# Patient Record
Sex: MALE | Race: WHITE | NOT HISPANIC OR LATINO | Employment: FULL TIME | URBAN - METROPOLITAN AREA
[De-identification: names, ages, dates, MRNs, and addresses within clinical notes are randomized per-mention and may not be internally consistent; named-entity substitution may affect disease eponyms.]

---

## 2017-04-06 ENCOUNTER — HOSPITAL ENCOUNTER (OUTPATIENT)
Dept: PHYSICAL THERAPY | Facility: HOSPITAL | Age: 47
Setting detail: RECURRING SERIES
Discharge: HOME | End: 2017-07-31
Attending: FAMILY MEDICINE | Admitting: FAMILY MEDICINE

## 2017-05-18 ENCOUNTER — HOSPITAL ENCOUNTER (OUTPATIENT)
Dept: LAB | Facility: HOSPITAL | Age: 47
Discharge: HOME | End: 2017-05-18
Attending: INTERNAL MEDICINE | Admitting: INTERNAL MEDICINE

## 2017-05-18 LAB
ALBUMIN SERPL-MCNC: 4 G/DL (ref 3.5–4.8)
ALP SERPL-CCNC: 73 IU/L (ref 32–91)
ALT SERPL-CCNC: 32 IU/L (ref 17–63)
ANION GAP SERPL CALC-SCNC: 11 MMOL/L (ref 10–20)
AST SERPL-CCNC: 21 IU/L (ref 15–41)
BILIRUB DIRECT SERPL-MCNC: 0.1 MG/DL (ref 0.1–0.5)
BILIRUB SERPL-MCNC: 0.9 MG/DL (ref 0.3–1.2)
BUN SERPL-MCNC: 15 MG/DL (ref 8–20)
BUN/CREAT SERPL: 10 (ref 6.2–20.3)
CALCIUM SERPL-MCNC: 9.2 MG/DL (ref 8.9–10.3)
CHLORIDE SERPL-SCNC: 105 MMOL/L (ref 101–111)
CHOLEST SERPL-MCNC: 152 MG/DL
CHOLEST/HDLC SERPL: 4.2 {RATIO}
CK SERPL-CCNC: 163 IU/L (ref 49–397)
CONV CO2: 26 MMOL/L (ref 22–32)
CONV LDL CHOLESTEROL DIRECT: 63 MG/DL (ref 0–100)
CONV TOTAL PROTEIN: 7 G/DL (ref 6.1–7.9)
CREAT UR-MCNC: 1.5 MG/DL (ref 0.7–1.2)
GLUCOSE SERPL-MCNC: 101 MG/DL (ref 65–99)
HDLC SERPL-MCNC: 36 MG/DL
LDLC/HDLC SERPL: 1.7 {RATIO}
LIPID INTERPRETATION: ABNORMAL
POTASSIUM SERPL-SCNC: 4 MMOL/L (ref 3.6–5.1)
SODIUM SERPL-SCNC: 138 MMOL/L (ref 136–144)
TRIGL SERPL-MCNC: 148 MG/DL
VLDLC SERPL CALC-MCNC: 53.3 MG/DL

## 2017-11-28 ENCOUNTER — HOSPITAL ENCOUNTER (OUTPATIENT)
Dept: LAB | Facility: HOSPITAL | Age: 47
Discharge: HOME | End: 2017-11-28
Attending: EMERGENCY MEDICINE | Admitting: EMERGENCY MEDICINE

## 2017-11-28 LAB
PSA SERPL-MCNC: 1.48 NG/ML (ref 0–4)
TESTOST SERPL-MCNC: 6.49 NG/ML (ref 1.7–7.8)

## 2019-12-19 PROBLEM — E66.9 OBESITY: Status: ACTIVE | Noted: 2019-12-19

## 2020-01-29 ENCOUNTER — OFFICE VISIT (OUTPATIENT)
Dept: CARDIOLOGY | Facility: CLINIC | Age: 50
End: 2020-01-29

## 2020-01-29 VITALS
HEIGHT: 75 IN | WEIGHT: 307 LBS | BODY MASS INDEX: 38.17 KG/M2 | OXYGEN SATURATION: 97 % | DIASTOLIC BLOOD PRESSURE: 90 MMHG | SYSTOLIC BLOOD PRESSURE: 144 MMHG | HEART RATE: 65 BPM

## 2020-01-29 DIAGNOSIS — E78.5 DYSLIPIDEMIA: ICD-10-CM

## 2020-01-29 DIAGNOSIS — I10 ESSENTIAL (PRIMARY) HYPERTENSION: Primary | ICD-10-CM

## 2020-01-29 DIAGNOSIS — G47.30 SLEEP APNEA, UNSPECIFIED TYPE: ICD-10-CM

## 2020-01-29 DIAGNOSIS — I49.3 PVCS (PREMATURE VENTRICULAR CONTRACTIONS): ICD-10-CM

## 2020-01-29 PROCEDURE — 99213 OFFICE O/P EST LOW 20 MIN: CPT | Performed by: INTERNAL MEDICINE

## 2020-01-29 PROCEDURE — 93000 ELECTROCARDIOGRAM COMPLETE: CPT | Performed by: INTERNAL MEDICINE

## 2020-02-26 ENCOUNTER — HOSPITAL ENCOUNTER (OUTPATIENT)
Dept: CARDIOLOGY | Facility: HOSPITAL | Age: 50
Discharge: HOME | End: 2020-02-26

## 2020-02-26 ENCOUNTER — HOSPITAL ENCOUNTER (OUTPATIENT)
Dept: CARDIOLOGY | Facility: HOSPITAL | Age: 50
Discharge: HOME | End: 2020-02-26
Admitting: INTERNAL MEDICINE

## 2020-02-26 VITALS
SYSTOLIC BLOOD PRESSURE: 124 MMHG | WEIGHT: 300 LBS | DIASTOLIC BLOOD PRESSURE: 62 MMHG | HEIGHT: 75 IN | BODY MASS INDEX: 37.3 KG/M2

## 2020-02-26 DIAGNOSIS — E78.5 DYSLIPIDEMIA: ICD-10-CM

## 2020-02-26 DIAGNOSIS — I10 ESSENTIAL (PRIMARY) HYPERTENSION: ICD-10-CM

## 2020-02-26 DIAGNOSIS — G47.30 SLEEP APNEA, UNSPECIFIED TYPE: ICD-10-CM

## 2020-02-26 DIAGNOSIS — I49.3 PVCS (PREMATURE VENTRICULAR CONTRACTIONS): ICD-10-CM

## 2020-02-26 LAB
BH CV STRESS BP STAGE 1: NORMAL
BH CV STRESS BP STAGE 2: NORMAL
BH CV STRESS BP STAGE 3: NORMAL
BH CV STRESS DURATION MIN STAGE 1: 2
BH CV STRESS DURATION MIN STAGE 2: 2
BH CV STRESS DURATION MIN STAGE 3: 2
BH CV STRESS DURATION MIN STAGE 4: 4
BH CV STRESS DURATION SEC STAGE 1: 0
BH CV STRESS DURATION SEC STAGE 2: 0
BH CV STRESS DURATION SEC STAGE 3: 0
BH CV STRESS DURATION SEC STAGE 4: 0
BH CV STRESS GRADE STAGE 1: 10
BH CV STRESS GRADE STAGE 2: 12
BH CV STRESS GRADE STAGE 3: 14
BH CV STRESS GRADE STAGE 4: 16
BH CV STRESS HR STAGE 1: 95
BH CV STRESS HR STAGE 2: 111
BH CV STRESS HR STAGE 3: 140
BH CV STRESS HR STAGE 4: 144
BH CV STRESS METS STAGE 1: 5
BH CV STRESS METS STAGE 2: 7.5
BH CV STRESS METS STAGE 3: 10
BH CV STRESS METS STAGE 4: 13.5
BH CV STRESS PROTOCOL 1: NORMAL
BH CV STRESS RECOVERY BP: NORMAL MMHG
BH CV STRESS RECOVERY HR: 99 BPM
BH CV STRESS SPEED STAGE 1: 1.7
BH CV STRESS SPEED STAGE 2: 2.5
BH CV STRESS SPEED STAGE 3: 3.4
BH CV STRESS SPEED STAGE 4: 4.2
BH CV STRESS STAGE 1: 1
BH CV STRESS STAGE 2: 2
BH CV STRESS STAGE 3: 3
BH CV STRESS STAGE 4: 4
MAXIMAL PREDICTED HEART RATE: 170 BPM
STRESS BASELINE BP: NORMAL MMHG
STRESS BASELINE HR: 67 BPM
STRESS POST ESTIMATED WORKLOAD: 10 METS
STRESS POST EXERCISE DUR MIN: 6 MIN
STRESS POST EXERCISE DUR SEC: 0 SEC
STRESS TARGET HR: 145 BPM

## 2020-02-26 PROCEDURE — 93018 CV STRESS TEST I&R ONLY: CPT | Performed by: INTERNAL MEDICINE

## 2020-02-26 PROCEDURE — 93306 TTE W/DOPPLER COMPLETE: CPT

## 2020-02-26 PROCEDURE — 93017 CV STRESS TEST TRACING ONLY: CPT

## 2020-02-26 PROCEDURE — 93016 CV STRESS TEST SUPVJ ONLY: CPT | Performed by: INTERNAL MEDICINE

## 2020-02-29 LAB
BH CV ECHO MEAS - ACS: 2.3 CM
BH CV ECHO MEAS - AO MAX PG (FULL): 4.3 MMHG
BH CV ECHO MEAS - AO MAX PG: 9.4 MMHG
BH CV ECHO MEAS - AO MEAN PG (FULL): 2.8 MMHG
BH CV ECHO MEAS - AO MEAN PG: 5.5 MMHG
BH CV ECHO MEAS - AO ROOT AREA: 11 CM^2
BH CV ECHO MEAS - AO ROOT DIAM: 3.7 CM
BH CV ECHO MEAS - AO V2 MAX: 153.4 CM/SEC
BH CV ECHO MEAS - AO V2 MEAN: 112.3 CM/SEC
BH CV ECHO MEAS - AO V2 VTI: 34.4 CM
BH CV ECHO MEAS - ASC AORTA: 3.3 CM
BH CV ECHO MEAS - AVA(I,A): 2.8 CM^2
BH CV ECHO MEAS - AVA(I,D): 2.8 CM^2
BH CV ECHO MEAS - AVA(V,A): 3.5 CM^2
BH CV ECHO MEAS - AVA(V,D): 3.5 CM^2
BH CV ECHO MEAS - EDV(CUBED): 135.7 ML
BH CV ECHO MEAS - EDV(MOD-SP4): 163.8 ML
BH CV ECHO MEAS - EDV(TEICH): 126 ML
BH CV ECHO MEAS - EF(CUBED): 38.7 %
BH CV ECHO MEAS - EF(MOD-SP4): 64.4 %
BH CV ECHO MEAS - EF(TEICH): 31.7 %
BH CV ECHO MEAS - ESV(CUBED): 83.2 ML
BH CV ECHO MEAS - ESV(MOD-SP4): 58.3 ML
BH CV ECHO MEAS - ESV(TEICH): 86 ML
BH CV ECHO MEAS - FS: 15.1 %
BH CV ECHO MEAS - IVS/LVPW: 1.2
BH CV ECHO MEAS - IVSD: 0.96 CM
BH CV ECHO MEAS - LA DIMENSION: 5.4 CM
BH CV ECHO MEAS - LA/AO: 1.4
BH CV ECHO MEAS - LV MASS(C)D: 158.2 GRAMS
BH CV ECHO MEAS - LV MAX PG: 5.1 MMHG
BH CV ECHO MEAS - LV MEAN PG: 2.6 MMHG
BH CV ECHO MEAS - LV V1 MAX: 113 CM/SEC
BH CV ECHO MEAS - LV V1 MEAN: 76.3 CM/SEC
BH CV ECHO MEAS - LV V1 VTI: 20.1 CM
BH CV ECHO MEAS - LVIDD: 5.1 CM
BH CV ECHO MEAS - LVIDS: 4.4 CM
BH CV ECHO MEAS - LVOT AREA: 4.8 CM^2
BH CV ECHO MEAS - LVOT DIAM: 2.5 CM
BH CV ECHO MEAS - LVPWD: 0.78 CM
BH CV ECHO MEAS - MV A MAX VEL: 55.2 CM/SEC
BH CV ECHO MEAS - MV DEC SLOPE: 516.6 CM/SEC^2
BH CV ECHO MEAS - MV DEC TIME: 0.17 SEC
BH CV ECHO MEAS - MV E MAX VEL: 90.3 CM/SEC
BH CV ECHO MEAS - MV E/A: 1.6
BH CV ECHO MEAS - MV MAX PG: 3.4 MMHG
BH CV ECHO MEAS - MV MEAN PG: 1.5 MMHG
BH CV ECHO MEAS - MV V2 MAX: 92.5 CM/SEC
BH CV ECHO MEAS - MV V2 MEAN: 57.2 CM/SEC
BH CV ECHO MEAS - MV V2 VTI: 30.1 CM
BH CV ECHO MEAS - MVA(VTI): 3.2 CM^2
BH CV ECHO MEAS - PA ACC TIME: 0.15 SEC
BH CV ECHO MEAS - PA MAX PG (FULL): 5.2 MMHG
BH CV ECHO MEAS - PA MAX PG: 7.3 MMHG
BH CV ECHO MEAS - PA PR(ACCEL): 10.5 MMHG
BH CV ECHO MEAS - PA V2 MAX: 135.3 CM/SEC
BH CV ECHO MEAS - RV MAX PG: 2.1 MMHG
BH CV ECHO MEAS - RV MEAN PG: 1.2 MMHG
BH CV ECHO MEAS - RV V1 MAX: 72.3 CM/SEC
BH CV ECHO MEAS - RV V1 MEAN: 51.7 CM/SEC
BH CV ECHO MEAS - RV V1 VTI: 16.8 CM
BH CV ECHO MEAS - SV(AO): 377.3 ML
BH CV ECHO MEAS - SV(CUBED): 52.6 ML
BH CV ECHO MEAS - SV(LVOT): 95.9 ML
BH CV ECHO MEAS - SV(MOD-SP4): 105.4 ML
BH CV ECHO MEAS - SV(TEICH): 40 ML
LV EF 2D ECHO EST: 50 %
MAXIMAL PREDICTED HEART RATE: 170 BPM
STRESS TARGET HR: 145 BPM

## 2020-02-29 PROCEDURE — 93306 TTE W/DOPPLER COMPLETE: CPT | Performed by: INTERNAL MEDICINE

## 2020-03-12 RX ORDER — BENAZEPRIL HYDROCHLORIDE 40 MG/1
TABLET, FILM COATED ORAL
Qty: 90 TABLET | Refills: 2 | Status: SHIPPED | OUTPATIENT
Start: 2020-03-12 | End: 2020-12-21

## 2020-08-14 RX ORDER — HYDRALAZINE HYDROCHLORIDE 50 MG/1
TABLET, FILM COATED ORAL
Qty: 180 TABLET | Refills: 2 | Status: SHIPPED | OUTPATIENT
Start: 2020-08-14 | End: 2021-05-24 | Stop reason: SDUPTHER

## 2020-12-19 DIAGNOSIS — I10 ESSENTIAL (PRIMARY) HYPERTENSION: Primary | ICD-10-CM

## 2020-12-19 DIAGNOSIS — E78.5 DYSLIPIDEMIA: ICD-10-CM

## 2020-12-21 NOTE — TELEPHONE ENCOUNTER
Spoke with pt - advised of labs needed and if not able to get done before appointment on 1/29, will need to r/s   Gave 1 mo supply medication.   Orders in.   Says will have drawn @ PCP office.

## 2020-12-22 RX ORDER — BENAZEPRIL HYDROCHLORIDE 40 MG/1
TABLET, FILM COATED ORAL
Qty: 30 TABLET | Refills: 0 | Status: SHIPPED | OUTPATIENT
Start: 2020-12-22 | End: 2021-03-10 | Stop reason: SDUPTHER

## 2021-01-29 ENCOUNTER — OFFICE VISIT (OUTPATIENT)
Dept: CARDIOLOGY | Facility: CLINIC | Age: 51
End: 2021-01-29

## 2021-01-29 VITALS
OXYGEN SATURATION: 98 % | DIASTOLIC BLOOD PRESSURE: 84 MMHG | WEIGHT: 312 LBS | HEIGHT: 75 IN | BODY MASS INDEX: 38.79 KG/M2 | TEMPERATURE: 96.8 F | HEART RATE: 67 BPM | SYSTOLIC BLOOD PRESSURE: 140 MMHG

## 2021-01-29 DIAGNOSIS — G47.30 SLEEP APNEA, UNSPECIFIED TYPE: ICD-10-CM

## 2021-01-29 DIAGNOSIS — I10 ESSENTIAL (PRIMARY) HYPERTENSION: ICD-10-CM

## 2021-01-29 DIAGNOSIS — I49.3 PVCS (PREMATURE VENTRICULAR CONTRACTIONS): ICD-10-CM

## 2021-01-29 DIAGNOSIS — E78.5 DYSLIPIDEMIA: Primary | ICD-10-CM

## 2021-01-29 PROCEDURE — 93000 ELECTROCARDIOGRAM COMPLETE: CPT | Performed by: INTERNAL MEDICINE

## 2021-01-29 PROCEDURE — 99214 OFFICE O/P EST MOD 30 MIN: CPT | Performed by: INTERNAL MEDICINE

## 2021-01-29 NOTE — PROGRESS NOTES
"    Subjective:     Encounter Date:01/29/2021      Patient ID: Chalo Hatch is a 51 y.o. male.    Chief Complaint: Hypertension, Hyperlipidemia, & PVC's  History of Present Illness     51-year-old white male patient with known history of hypertension and palpitation,obesity, comes back for followup.       patient also has problems with a dyslipidemia hypertension and obstructive sleep apnea        patient does have underlying chronic renal insufficiency     Patient stopped  the amlodipine due to the swelling continue benazepril and also I started him on  hydralazine   patient blood pressure is better   swelling is much better  February 2020 treadmill stress test no ischemia no arrhythmia  February 2020 echocardiogram EF low normal at 50%    Patient did labs recently with PCP pending he is doing well from cardiac standpoint occasional headaches his blood pressure is actually better now  Will be followed by PCP regarding the problems with headache especially if still having headaches with normal blood pressure  I will see him again in 1 year    The following portions of the patient's history were reviewed and updated as appropriate: Allergies current medications past family history past medical history past social history past surgical history problem list and review of systems  Past Medical History:   Diagnosis Date   • Chronic renal insufficiency    • Dyslipidemia    • Hypertension    • Obesity    • PVC (premature ventricular contraction)     Hx of   • Seasonal allergies    • Sleep apnea     Using CPAP     Past Surgical History:   Procedure Laterality Date   • VASECTOMY       /84 (BP Location: Right arm, Patient Position: Sitting, Cuff Size: Large Adult)   Pulse 67   Temp 96.8 °F (36 °C) (Infrared)   Ht 190.5 cm (75\")   Wt (!) 142 kg (312 lb)   SpO2 98%   BMI 39.00 kg/m²   Family History   Problem Relation Age of Onset   • Hypertension Mother    • Clotting disorder Mother    • Hypertension Father    • " No Known Problems Sister    • No Known Problems Brother    • No Known Problems Maternal Aunt    • No Known Problems Maternal Uncle    • No Known Problems Paternal Aunt    • No Known Problems Paternal Uncle    • No Known Problems Maternal Grandmother    • No Known Problems Maternal Grandfather    • No Known Problems Paternal Grandmother    • Diabetes Paternal Grandfather    • No Known Problems Other    • Anemia Neg Hx    • Arrhythmia Neg Hx    • Asthma Neg Hx    • Fainting Neg Hx    • Heart attack Neg Hx    • Heart disease Neg Hx    • Heart failure Neg Hx    • Hyperlipidemia Neg Hx        Current Outpatient Medications:   •  benazepril (LOTENSIN) 40 MG tablet, TAKE 1 TABLET BY MOUTH EVERY DAY, Disp: 30 tablet, Rfl: 0  •  cetirizine (ZYRTEC ALLERGY) 10 MG tablet, Take 1 tablet by mouth every night at bedtime., Disp: , Rfl:   •  hydrALAZINE (APRESOLINE) 50 MG tablet, TAKE 1 TABLET BY MOUTH TWICE A DAY, Disp: 180 tablet, Rfl: 2  •  levothyroxine (SYNTHROID, LEVOTHROID) 50 MCG tablet, Take 1 tablet by mouth Every Morning Before Breakfast., Disp: , Rfl:   •  Omega-3 Fatty Acids (FISH OIL) 1000 MG capsule capsule, Take 1 capsule by mouth Daily., Disp: , Rfl:   •  triamterene-hydrochlorothiazide (MAXZIDE-25) 37.5-25 MG per tablet, Take 1 tablet by mouth Daily., Disp: , Rfl:   Social History     Socioeconomic History   • Marital status:      Spouse name: Not on file   • Number of children: Not on file   • Years of education: Not on file   • Highest education level: Not on file   Tobacco Use   • Smoking status: Never Smoker   • Smokeless tobacco: Never Used   Substance and Sexual Activity   • Alcohol use: Yes     Comment: Social   • Drug use: Never   • Sexual activity: Defer     Allergies   Allergen Reactions   • Hydrocodone-Acetaminophen Other (See Comments)     Cold; sweaty     Review of Systems   Constitution: Negative for fever and malaise/fatigue.   HENT: Negative for congestion and hearing loss.    Eyes: Negative  for double vision and visual disturbance.   Cardiovascular: Negative for chest pain, claudication, dyspnea on exertion, leg swelling and syncope.   Respiratory: Negative for cough and shortness of breath.    Endocrine: Negative for cold intolerance.   Skin: Negative for color change and rash.   Musculoskeletal: Negative for arthritis and joint pain.   Gastrointestinal: Negative for abdominal pain and heartburn.   Genitourinary: Negative for hematuria.   Neurological: Positive for headaches. Negative for excessive daytime sleepiness and dizziness.   Psychiatric/Behavioral: Negative for depression. The patient is not nervous/anxious.    All other systems reviewed and are negative.             Objective:     Physical Exam  Patient is stable not in any acute distress   Vitals reviewed neck no JVP elevation lungs but and mostly clear heart sounds S1-S2 regular no significant murmur gallop extremities no edema bilateral pulses present equal        ECG 12 Lead    Date/Time: 1/29/2021 9:25 AM  Performed by: Nicola Lopez MD  Authorized by: Nicola Lopez MD   Comments: EKG today normal sinus rhythm borderline right axis deviation compared to the last EKG no changes noted            Lab Review:       Assessment:          Diagnosis Plan   1. Dyslipidemia  CK    Lipid Panel    Comprehensive Metabolic Panel   2. Essential (primary) hypertension  CK    Lipid Panel    Comprehensive Metabolic Panel   3. Sleep apnea, unspecified type  CK    Lipid Panel    Comprehensive Metabolic Panel   4. PVCs (premature ventricular contractions)  CK    Lipid Panel    Comprehensive Metabolic Panel          Plan:       MDM  Number of Diagnoses or Management Options  Dyslipidemia: established, improving  Essential (primary) hypertension: established, improving  PVCs (premature ventricular contractions): established, improving  Sleep apnea, unspecified type: established, improving     Amount and/or Complexity of Data  Reviewed  Clinical lab tests: reviewed and ordered  Review and summarize past medical records: yes  Independent visualization of images, tracings, or specimens: yes    Risk of Complications, Morbidity, and/or Mortality  Presenting problems: moderate  Management options: moderate    Patient Progress  Patient progress: stable    Continue aggressive control of hypertension dyslipidemia sleep apnea needs to be aggressively controlled no more problems with palpitations  Will review the recent labs advised him labs again in the next 1 year  Follow-up with PCP regarding headaches

## 2021-03-10 RX ORDER — BENAZEPRIL HYDROCHLORIDE 40 MG/1
40 TABLET, FILM COATED ORAL DAILY
Qty: 90 TABLET | Refills: 1 | Status: SHIPPED | OUTPATIENT
Start: 2021-03-10 | End: 2021-09-17

## 2021-03-25 NOTE — PROGRESS NOTES
Subjective:     Encounter Date:03/26/2021      Patient ID: Chalo Hatch is a 51 y.o. male.    Chief Complaint: Uncontrolled HTN  History of Present Illness  51-year-old white male patient with known history of hypertension and palpitation,obesity, comes back for followup.       patient also has problems with a dyslipidemia hypertension and obstructive sleep apnea         patient does have underlying chronic renal insufficiency     Patient stopped  the amlodipine due to the swelling continue benazepril and also I started him on  hydralazine     swelling is much better  February 2020 treadmill stress test no ischemia no arrhythmia  February 2020 echocardiogram EF low normal at 50%  Patient comes today because of uncontrolled hypertension yesterday he increase triamterene hydrochlorothiazide to 75/50 every day  Blood pressure is getting better advised him to increase hydralazine to 53 times daily if the blood pressure is still uncontrolled  Needs aggressive control of sleep apnea weight loss salt reduction to help with blood pressure control follow-up as scheduled  Patient was advised to follow-up with PCP regarding electrolytes rechecked due to the recent increase in Maxzide    The following portions of the patient's history were reviewed and updated as appropriate: Allergies current medications past family history past medical history past social history past surgical history problem list and review of systems  Past Medical History:   Diagnosis Date   • Chronic renal insufficiency    • Dyslipidemia    • Hypertension    • Obesity    • PVC (premature ventricular contraction)     Hx of   • Seasonal allergies    • Sleep apnea     Using CPAP     Past Surgical History:   Procedure Laterality Date   • VASECTOMY       There were no vitals taken for this visit.  Family History   Problem Relation Age of Onset   • Hypertension Mother    • Clotting disorder Mother    • Hypertension Father    • No Known Problems Sister     • No Known Problems Brother    • No Known Problems Maternal Aunt    • No Known Problems Maternal Uncle    • No Known Problems Paternal Aunt    • No Known Problems Paternal Uncle    • No Known Problems Maternal Grandmother    • No Known Problems Maternal Grandfather    • No Known Problems Paternal Grandmother    • Diabetes Paternal Grandfather    • No Known Problems Other    • Anemia Neg Hx    • Arrhythmia Neg Hx    • Asthma Neg Hx    • Fainting Neg Hx    • Heart attack Neg Hx    • Heart disease Neg Hx    • Heart failure Neg Hx    • Hyperlipidemia Neg Hx        Current Outpatient Medications:   •  benazepril (LOTENSIN) 40 MG tablet, Take 1 tablet by mouth Daily., Disp: 90 tablet, Rfl: 1  •  cetirizine (ZYRTEC ALLERGY) 10 MG tablet, Take 1 tablet by mouth every night at bedtime., Disp: , Rfl:   •  cyclobenzaprine (FLEXERIL) 10 MG tablet, 1 tablet p.o. every 8 hours as needed, Disp: 15 tablet, Rfl: 0  •  hydrALAZINE (APRESOLINE) 50 MG tablet, TAKE 1 TABLET BY MOUTH TWICE A DAY, Disp: 180 tablet, Rfl: 2  •  levothyroxine (SYNTHROID, LEVOTHROID) 50 MCG tablet, Take 1 tablet by mouth Every Morning Before Breakfast., Disp: , Rfl:   •  Omega-3 Fatty Acids (FISH OIL) 1000 MG capsule capsule, Take 1 capsule by mouth Daily., Disp: , Rfl:   •  traMADol (ULTRAM) 50 MG tablet, 1 tablet p.o. every 6 hours, Disp: 20 tablet, Rfl: 0  •  triamterene-hydrochlorothiazide (MAXZIDE-25) 37.5-25 MG per tablet, Take 1 tablet by mouth Daily., Disp: , Rfl:   Social History     Socioeconomic History   • Marital status:      Spouse name: Not on file   • Number of children: Not on file   • Years of education: Not on file   • Highest education level: Not on file   Tobacco Use   • Smoking status: Current Every Day Smoker   • Smokeless tobacco: Never Used   Vaping Use   • Vaping Use: Never used   Substance and Sexual Activity   • Alcohol use: Yes     Comment: Social   • Drug use: Never   • Sexual activity: Defer     Allergies   Allergen  Reactions   • Hydrocodone-Acetaminophen Other (See Comments)     Cold; sweaty     Review of Systems   Constitutional: Negative for fever and malaise/fatigue.   HENT: Negative for congestion and hearing loss.    Eyes: Negative for double vision and visual disturbance.   Cardiovascular: Negative for chest pain, claudication, dyspnea on exertion, leg swelling and syncope.   Respiratory: Negative for cough and shortness of breath.    Endocrine: Negative for cold intolerance.   Skin: Negative for color change and rash.   Musculoskeletal: Negative for arthritis and joint pain.   Gastrointestinal: Negative for abdominal pain and heartburn.   Genitourinary: Negative for hematuria.   Neurological: Negative for excessive daytime sleepiness and dizziness.   Psychiatric/Behavioral: Negative for depression. The patient is not nervous/anxious.    All other systems reviewed and are negative.             Objective:     Physical Exam  Blood pressure 144/84 today better than yesterday neck no JVP elevation lungs clear heart sounds S1-S2 regular extremities no edema bilateral pulses present equal  Procedures    Lab Review:       Assessment:         No diagnosis found.       Plan:       MDM  Number of Diagnoses or Management Options  Dyslipidemia: established, improving  Essential (primary) hypertension: established, worsening  PVCs (premature ventricular contractions): established, improving  Sleep apnea, unspecified type: established, improving     Amount and/or Complexity of Data Reviewed  Review and summarize past medical records: yes    Risk of Complications, Morbidity, and/or Mortality  Presenting problems: moderate  Management options: moderate    Patient Progress  Patient progress: other (comment)

## 2021-03-26 ENCOUNTER — OFFICE VISIT (OUTPATIENT)
Dept: CARDIOLOGY | Facility: CLINIC | Age: 51
End: 2021-03-26

## 2021-03-26 VITALS
OXYGEN SATURATION: 97 % | HEIGHT: 75 IN | TEMPERATURE: 97.5 F | SYSTOLIC BLOOD PRESSURE: 143 MMHG | HEART RATE: 69 BPM | BODY MASS INDEX: 38.1 KG/M2 | WEIGHT: 306.4 LBS | DIASTOLIC BLOOD PRESSURE: 84 MMHG

## 2021-03-26 DIAGNOSIS — I49.3 PVCS (PREMATURE VENTRICULAR CONTRACTIONS): ICD-10-CM

## 2021-03-26 DIAGNOSIS — E78.5 DYSLIPIDEMIA: Primary | ICD-10-CM

## 2021-03-26 DIAGNOSIS — I10 ESSENTIAL (PRIMARY) HYPERTENSION: ICD-10-CM

## 2021-03-26 DIAGNOSIS — G47.30 SLEEP APNEA, UNSPECIFIED TYPE: ICD-10-CM

## 2021-03-26 PROCEDURE — 99214 OFFICE O/P EST MOD 30 MIN: CPT | Performed by: INTERNAL MEDICINE

## 2021-03-26 RX ORDER — TRIAMTERENE AND HYDROCHLOROTHIAZIDE 75; 50 MG/1; MG/1
1 TABLET ORAL DAILY
COMMUNITY
Start: 2021-03-25 | End: 2022-01-31 | Stop reason: SDUPTHER

## 2021-03-26 RX ORDER — PHENTERMINE HYDROCHLORIDE 37.5 MG/1
17.25 CAPSULE ORAL EVERY MORNING
COMMUNITY
End: 2022-01-31 | Stop reason: ALTCHOICE

## 2021-05-24 RX ORDER — HYDRALAZINE HYDROCHLORIDE 50 MG/1
50 TABLET, FILM COATED ORAL 2 TIMES DAILY
Qty: 180 TABLET | Refills: 3 | Status: SHIPPED | OUTPATIENT
Start: 2021-05-24 | End: 2022-03-21

## 2021-07-15 ENCOUNTER — TELEPHONE (OUTPATIENT)
Dept: NEUROLOGY | Facility: CLINIC | Age: 51
End: 2021-07-15

## 2021-07-15 NOTE — TELEPHONE ENCOUNTER
Provider: DR SEIPEL    Caller: DANIEL MOLINA    Relationship to Patient: SELF    Phone Number: [phone]    Reason for Call: THE PT CALLED IN BECAUSE HE HAS ONE OF THE CPAP MACHINES WITH THE RECALL. I NOTIFIED HIM TO CONTACT HIS DME COMPANY. ALSO PLEASE GIVE HIM CALL TO ADVISE IF HE SHOULD CONTINUE USING THE MACHINE OR NOT.

## 2021-07-19 NOTE — TELEPHONE ENCOUNTER
The company has not given out information on the absolute  risk of problems using the machine so I don not know.      Assuming relatively low risk from using the machine,and history of moderate jeannie, I recommend continued use given jeannie and significant comorbidity, such as HTN      [place] machine with [employer].     Obtain new device asap

## 2021-07-26 NOTE — TELEPHONE ENCOUNTER
Pt had not registered machine. Was given website information to do so.    He was advised to continue using it at this time. And to handwash only

## 2021-09-17 RX ORDER — BENAZEPRIL HYDROCHLORIDE 40 MG/1
TABLET, FILM COATED ORAL
Qty: 90 TABLET | Refills: 1 | Status: SHIPPED | OUTPATIENT
Start: 2021-09-17 | End: 2022-03-21

## 2021-09-17 NOTE — TELEPHONE ENCOUNTER
Rx Refill Note  Requested Prescriptions     Pending Prescriptions Disp Refills   • benazepril (LOTENSIN) 40 MG tablet [Pharmacy Med Name: BENAZEPRIL HCL 40 MG TABLET] 90 tablet 1     Sig: TAKE 1 TABLET BY MOUTH EVERY DAY      Last office visit with prescribing clinician: 3/26/2021      Next office visit with prescribing clinician: 1/31/2022            Radha Varela MA  09/17/21, 09:07 EDT

## 2022-01-11 ENCOUNTER — TELEPHONE (OUTPATIENT)
Dept: NEUROLOGY | Facility: CLINIC | Age: 52
End: 2022-01-11

## 2022-01-11 NOTE — TELEPHONE ENCOUNTER
SEIPEL KAREN    WIFE    [phone]    SEE ENCOUNTER THREAD FROM 7-15. PATIENT'S MACHINE HAS NOW BEEN REGISTERED. WIFE INGE SAYS IT WAS REGISTERED IN July BUT THAT PATIENT NEVER CALLED BACK.     MACHINE MORE THAN 5 YEARS OLD BUT CANT RMR EXACTLY HOW OLD    DME [employer]    NOW THAT REGISTERED, WILL [facility] SEND NEW MACHINE OR DOES NEW ONE HAVE TO BE ORDERED? AND DOES PATIENT NEED F/U FIRST? LAST SEEN 2018    IS AWARE WILL BE FEW DAYS BEFORE CALL BACK

## 2022-01-12 NOTE — TELEPHONE ENCOUNTER
Will have to call patient. Has not been seen so cannot order new machine. HUB okay to read if patient calls. Will need appt to be seen

## 2022-01-31 ENCOUNTER — OFFICE VISIT (OUTPATIENT)
Dept: CARDIOLOGY | Facility: CLINIC | Age: 52
End: 2022-01-31

## 2022-01-31 VITALS
OXYGEN SATURATION: 99 % | BODY MASS INDEX: 39.17 KG/M2 | HEIGHT: 75 IN | SYSTOLIC BLOOD PRESSURE: 153 MMHG | WEIGHT: 315 LBS | HEART RATE: 65 BPM | DIASTOLIC BLOOD PRESSURE: 95 MMHG

## 2022-01-31 DIAGNOSIS — E78.5 DYSLIPIDEMIA: ICD-10-CM

## 2022-01-31 DIAGNOSIS — R00.2 PALPITATIONS: Primary | ICD-10-CM

## 2022-01-31 DIAGNOSIS — I49.3 PVCS (PREMATURE VENTRICULAR CONTRACTIONS): ICD-10-CM

## 2022-01-31 DIAGNOSIS — R09.89 LABILE HYPERTENSION: ICD-10-CM

## 2022-01-31 DIAGNOSIS — E66.9 OBESITY (BMI 30-39.9): ICD-10-CM

## 2022-01-31 PROCEDURE — 99214 OFFICE O/P EST MOD 30 MIN: CPT | Performed by: INTERNAL MEDICINE

## 2022-01-31 PROCEDURE — 93000 ELECTROCARDIOGRAM COMPLETE: CPT | Performed by: INTERNAL MEDICINE

## 2022-01-31 RX ORDER — TRIAMTERENE AND HYDROCHLOROTHIAZIDE 37.5; 25 MG/1; MG/1
CAPSULE ORAL
COMMUNITY
Start: 2021-11-22

## 2022-01-31 RX ORDER — BISOPROLOL FUMARATE 10 MG/1
10 TABLET, FILM COATED ORAL DAILY
Qty: 90 TABLET | Refills: 3 | Status: SHIPPED | OUTPATIENT
Start: 2022-01-31

## 2022-01-31 RX ORDER — LEVOTHYROXINE SODIUM 0.07 MG/1
TABLET ORAL
COMMUNITY
Start: 2021-12-08

## 2022-01-31 NOTE — PROGRESS NOTES
Subjective:     Encounter Date:01/31/2022      Patient ID: Chalo Hatch is a 52 y.o. male.    Chief Complaint : Establish cardiac care.  History of Present Illness      Mr. Chalo Hatch  has PMH of    Uncontrolled hypertension  Obesity with BMI over 39  Dyslipidemia  CKD  ARIEL  Allergies/intolerance to hydrocodone acetaminophen  Vasectomy  Cigarette smoker    He had to establish cardiac care. Patient used to see Dr. Guerrero, wants to follow-up with me now. Patient is complaining of fast heart rates with no aggravating or relieving factors. Patient denies taking any over-the-counter supplements. Denies any chest pain.  Review of work-up revealed:  TMT 2/26/2021 - for ischemia  Echocardiogram 2/26/2021 revealed EF of 50%  Labs done 1/22/2021 revealed normal CMP except for glucose of 101, normal CK.  Lipid profile with cholesterol 230, triglycerides 245, , HDL 41.          Assessment:    Palpitations, fast heart rates  'Hypertension  Dyslipidemia  Obesity with BMI over 30      Recommendations and plan:    All patient problems are new to me.  Reviewed records and summarized in HPI.  We will add beta-blockers bisoprolol to medical regimen.  We will continue benazepril, hydralazine, triamterene hydrochlorothiazide  Reviewed EKG results with patient  Reviewed BMI over 30 counseled on weight loss diet and exercise.  We will get labs from PMDs office before next visit.  Advised patient to check blood pressure at home and call if it is elevated.          ECG 12 Lead    Date/Time: 1/31/2022 4:05 PM  Performed by: Jer Bray MD  Authorized by: Jer Bray MD   Comparison: compared with previous ECG from 1/29/2021  Comparison to previous ECG: EKG done today reviewed/interpreted by me reveals sinus bradycardia with rate of 57 bpm with IVCD, unchanged from EKG from 1/29/2021              The following portions of the patient's history were reviewed and updated as appropriate: allergies,  current medications, past family history, past medical history, past social history, past surgical history and problem list.    Assessment:         MDM     Diagnosis Plan   1. Palpitations     2. Labile hypertension     3. Obesity (BMI 30-39.9)     4. PVCs (premature ventricular contractions)     5. Dyslipidemia            Plan:               Past Medical History:  Past Medical History:   Diagnosis Date   • Chronic renal insufficiency    • Dyslipidemia    • Hypertension    • Obesity    • PVC (premature ventricular contraction)     Hx of   • PVC (premature ventricular contraction)    • Seasonal allergies    • Sleep apnea     Using CPAP     Past Surgical History:  Past Surgical History:   Procedure Laterality Date   • VASECTOMY        Allergies:  Allergies   Allergen Reactions   • Hydrocodone-Acetaminophen Other (See Comments)     Cold; sweaty     Home Meds:  Current Meds:     Current Outpatient Medications:   •  benazepril (LOTENSIN) 40 MG tablet, TAKE 1 TABLET BY MOUTH EVERY DAY, Disp: 90 tablet, Rfl: 1  •  cetirizine (ZYRTEC ALLERGY) 10 MG tablet, Take 1 tablet by mouth every night at bedtime., Disp: , Rfl:   •  hydrALAZINE (APRESOLINE) 50 MG tablet, Take 1 tablet by mouth 2 (Two) Times a Day., Disp: 180 tablet, Rfl: 3  •  levothyroxine (SYNTHROID, LEVOTHROID) 75 MCG tablet, , Disp: , Rfl:   •  Omega-3 Fatty Acids (FISH OIL) 1000 MG capsule capsule, Take 1 capsule by mouth Daily., Disp: , Rfl:   •  triamterene-hydrochlorothiazide (DYAZIDE) 37.5-25 MG per capsule, , Disp: , Rfl:   •  bisoprolol (ZEBeta) 10 MG tablet, Take 1 tablet by mouth Daily., Disp: 90 tablet, Rfl: 3  Social History:   Social History     Tobacco Use   • Smoking status: Former Smoker     Types: Cigarettes   • Smokeless tobacco: Never Used   Substance Use Topics   • Alcohol use: Yes     Comment: Social      Family History:  Family History   Problem Relation Age of Onset   • Hypertension Mother    • Clotting disorder Mother    • Hypertension Father  "   • No Known Problems Sister    • No Known Problems Brother    • No Known Problems Maternal Aunt    • No Known Problems Maternal Uncle    • No Known Problems Paternal Aunt    • No Known Problems Paternal Uncle    • No Known Problems Maternal Grandmother    • No Known Problems Maternal Grandfather    • No Known Problems Paternal Grandmother    • Diabetes Paternal Grandfather    • No Known Problems Other    • Anemia Neg Hx    • Arrhythmia Neg Hx    • Asthma Neg Hx    • Fainting Neg Hx    • Heart attack Neg Hx    • Heart disease Neg Hx    • Heart failure Neg Hx    • Hyperlipidemia Neg Hx               Review of Systems   Cardiovascular: Positive for leg swelling and palpitations. Negative for chest pain.   Respiratory: Negative for shortness of breath.    Neurological: Negative for dizziness and numbness.     All other systems are negative         Objective:     Physical Exam  /95 (BP Location: Left arm, Patient Position: Sitting, Cuff Size: Adult)   Pulse 65   Ht 190.5 cm (75\")   Wt (!) 144 kg (317 lb)   SpO2 99%   BMI 39.62 kg/m²   General:  Appears in no acute distress, pleasant obese  Eyes: Sclera is anicteric,  conjunctiva is clear   HEENT:  No JVD.  No carotid bruits  Respiratory: Respirations regular and unlabored at rest.  Clear to auscultation  Cardiovascular: S1,S2 Regular rate and rhythm. No murmur, rub or gallop auscultated.   Extremities: No digital clubbing or cyanosis, no edema  Skin: Color pink. Skin warm and dry to touch. No rashes  No xanthoma  Neuro: Alert and awake, no lateralizing deficits appreciated    Lab Reviewed:         Jer Bray MD  1/31/2022 16:18 EST      Much of the above report is an electronic transcription/translation of the spoken language to printed text using Dragon Software. As such, the subtleties and finesse of the spoken language may permit erroneous, or at times, nonsensical words or phrases to be inadvertently transcribed; thus changes may be made at " a later date to rectify these errors.

## 2022-02-17 ENCOUNTER — OFFICE VISIT (OUTPATIENT)
Dept: NEUROLOGY | Facility: CLINIC | Age: 52
End: 2022-02-17

## 2022-02-17 VITALS
WEIGHT: 315 LBS | RESPIRATION RATE: 16 BRPM | BODY MASS INDEX: 39.17 KG/M2 | HEIGHT: 75 IN | DIASTOLIC BLOOD PRESSURE: 81 MMHG | SYSTOLIC BLOOD PRESSURE: 164 MMHG | TEMPERATURE: 97.3 F | HEART RATE: 48 BPM

## 2022-02-17 DIAGNOSIS — G47.33 OBSTRUCTIVE SLEEP APNEA SYNDROME: Primary | ICD-10-CM

## 2022-02-17 PROCEDURE — 99203 OFFICE O/P NEW LOW 30 MIN: CPT | Performed by: PSYCHIATRY & NEUROLOGY

## 2022-02-17 NOTE — PROGRESS NOTES
"Chief Complaint  Sleep Apnea    Subjective          Chalo Hatch presents to [facility]  History of Present Illness   Patient was last seen in 2018. Patient states he is benefiting from pap therapy, he states he has registered his machine for recall, he uses  Nasal pillows and gets supplies from [employer]      Sleep testing history:     last sleep study 2008     ON CPAP 9-12  AVG 7HR 100% COMPLIANCE AG PRESSURE 9.5 NO LEAK AHI 2.8    [place] Sleepiness Scale:  Sitting and reading 2 WatchingTV 1  Sitting, inactive, in a public place 1  As a passenger in a car for 1 hour w/o a break  1  Lying down to rest in the afternoon  1  Sitting and talking to someone  0  Sitting quietly after a lunch  1  In a car, while stopped for traffic or a light  0  Total 7    Review of Systems   HENT: Negative for nosebleeds and postnasal drip.    Eyes: Negative for pain and itching.   Respiratory: Negative for cough and shortness of breath.    Cardiovascular: Negative for palpitations.   Gastrointestinal: Negative for abdominal distention.   Genitourinary: Negative for frequency and urgency.   Musculoskeletal: Negative for neck pain and neck stiffness.   Neurological: Negative for dizziness and headaches.   Psychiatric/Behavioral: Negative for decreased concentration and sleep disturbance.   All other systems reviewed and are negative.    Objective   Vital Signs:   /81   Pulse (!) 48   Temp 97.3 °F (36.3 °C) (Temporal)   Resp 16   Ht 190.5 cm (75\")   Wt (!) 146 kg (321 lb)   BMI 40.12 kg/m²     Physical Exam  Vitals reviewed.   Constitutional:       Appearance: Normal appearance.   Pulmonary:      Effort: Pulmonary effort is normal. No respiratory distress.   Neurological:      Mental Status: He is alert and oriented to person, place, and time.   Psychiatric:         Mood and Affect: Mood normal.         Behavior: Behavior normal.        Result Review :                 Assessment and Plan  "   Diagnoses and all orders for this visit:    1. Obstructive sleep apnea syndrome (Primary)      Pt aware of the recall.    The patient is compliant with and benefiting from PAP therapy.         Follow Up   Return in about 1 year (around 2/17/2023).    Patient was given instructions and counseling regarding his condition or for health maintenance advice. Please see specific information pulled into the AVS if appropriate.       This document has been electronically signed by Joseph Seipel, MD on February 17, 2022 16:55 EST

## 2022-03-21 RX ORDER — BENAZEPRIL HYDROCHLORIDE 40 MG/1
TABLET, FILM COATED ORAL
Qty: 90 TABLET | Refills: 1 | Status: SHIPPED | OUTPATIENT
Start: 2022-03-21 | End: 2022-09-09

## 2022-03-21 RX ORDER — HYDRALAZINE HYDROCHLORIDE 50 MG/1
TABLET, FILM COATED ORAL
Qty: 180 TABLET | Refills: 2 | Status: SHIPPED | OUTPATIENT
Start: 2022-03-21 | End: 2023-03-24

## 2022-03-21 NOTE — TELEPHONE ENCOUNTER
Rx Refill Note  Requested Prescriptions     Pending Prescriptions Disp Refills   • benazepril (LOTENSIN) 40 MG tablet [Pharmacy Med Name: BENAZEPRIL HCL 40 MG TABLET] 90 tablet 1     Sig: TAKE 1 TABLET BY MOUTH EVERY DAY   • hydrALAZINE (APRESOLINE) 50 MG tablet [Pharmacy Med Name: HYDRALAZINE 50 MG TABLET] 180 tablet 2     Sig: TAKE 1 TABLET BY MOUTH TWICE A DAY      Last office visit with prescribing clinician: 1/31/2022      Next office visit with prescribing clinician: 1/31/2023            Radha Varela MA  03/21/22, 09:19 EDT

## 2022-09-09 RX ORDER — BENAZEPRIL HYDROCHLORIDE 40 MG/1
TABLET, FILM COATED ORAL
Qty: 90 TABLET | Refills: 1 | Status: SHIPPED | OUTPATIENT
Start: 2022-09-09

## 2022-09-09 NOTE — TELEPHONE ENCOUNTER
Rx Refill Note  Requested Prescriptions     Pending Prescriptions Disp Refills   • benazepril (LOTENSIN) 40 MG tablet [Pharmacy Med Name: BENAZEPRIL HCL 40 MG TABLET] 90 tablet 1     Sig: TAKE 1 TABLET BY MOUTH EVERY DAY      Last office visit with prescribing clinician: 1/31/2022      Next office visit with prescribing clinician: 1/31/2023            LULU LIRA MA  09/09/22, 08:05 EDT

## 2022-12-28 ENCOUNTER — TRANSCRIBE ORDERS (OUTPATIENT)
Dept: ADMINISTRATIVE | Facility: HOSPITAL | Age: 52
End: 2022-12-28
Payer: COMMERCIAL

## 2022-12-28 ENCOUNTER — HOSPITAL ENCOUNTER (OUTPATIENT)
Dept: CARDIOLOGY | Facility: HOSPITAL | Age: 52
Discharge: HOME | End: 2022-12-28
Payer: COMMERCIAL

## 2022-12-28 ENCOUNTER — LAB (OUTPATIENT)
Dept: LAB | Facility: HOSPITAL | Age: 52
End: 2022-12-28
Payer: COMMERCIAL

## 2022-12-28 DIAGNOSIS — Z01.818 PRE-OP TESTING: ICD-10-CM

## 2022-12-28 DIAGNOSIS — Z01.818 PRE-OP TESTING: Primary | ICD-10-CM

## 2022-12-28 LAB
ALBUMIN SERPL-MCNC: 4 G/DL (ref 3.5–5.2)
ANION GAP SERPL CALCULATED.3IONS-SCNC: 7.7 MMOL/L (ref 5–15)
BASOPHILS # BLD AUTO: 0.09 10*3/MM3 (ref 0–0.2)
BASOPHILS NFR BLD AUTO: 1.6 % (ref 0–1.5)
BUN SERPL-MCNC: 17 MG/DL (ref 6–20)
BUN/CREAT SERPL: 14.7 (ref 7–25)
CALCIUM SPEC-SCNC: 9.5 MG/DL (ref 8.6–10.5)
CHLORIDE SERPL-SCNC: 104 MMOL/L (ref 98–107)
CO2 SERPL-SCNC: 27.3 MMOL/L (ref 22–29)
CREAT SERPL-MCNC: 1.16 MG/DL (ref 0.76–1.27)
DEPRECATED RDW RBC AUTO: 41 FL (ref 37–54)
EGFRCR SERPLBLD CKD-EPI 2021: 75.8 ML/MIN/1.73
EOSINOPHIL # BLD AUTO: 0.26 10*3/MM3 (ref 0–0.4)
EOSINOPHIL NFR BLD AUTO: 4.6 % (ref 0.3–6.2)
ERYTHROCYTE [DISTWIDTH] IN BLOOD BY AUTOMATED COUNT: 13 % (ref 12.3–15.4)
GLUCOSE SERPL-MCNC: 118 MG/DL (ref 65–99)
HBA1C MFR BLD: 6.3 % (ref 3.5–5.6)
HCT VFR BLD AUTO: 43.6 % (ref 37.5–51)
HGB BLD-MCNC: 14.7 G/DL (ref 13–17.7)
IMM GRANULOCYTES # BLD AUTO: 0.02 10*3/MM3 (ref 0–0.05)
IMM GRANULOCYTES NFR BLD AUTO: 0.4 % (ref 0–0.5)
LYMPHOCYTES # BLD AUTO: 2.22 10*3/MM3 (ref 0.7–3.1)
LYMPHOCYTES NFR BLD AUTO: 39.6 % (ref 19.6–45.3)
MCH RBC QN AUTO: 29.4 PG (ref 26.6–33)
MCHC RBC AUTO-ENTMCNC: 33.7 G/DL (ref 31.5–35.7)
MCV RBC AUTO: 87.2 FL (ref 79–97)
MONOCYTES # BLD AUTO: 0.49 10*3/MM3 (ref 0.1–0.9)
MONOCYTES NFR BLD AUTO: 8.8 % (ref 5–12)
MRSA DNA SPEC QL NAA+PROBE: NORMAL
NEUTROPHILS NFR BLD AUTO: 2.52 10*3/MM3 (ref 1.7–7)
NEUTROPHILS NFR BLD AUTO: 45 % (ref 42.7–76)
NRBC BLD AUTO-RTO: 0 /100 WBC (ref 0–0.2)
PLATELET # BLD AUTO: 208 10*3/MM3 (ref 140–450)
PMV BLD AUTO: 9.1 FL (ref 6–12)
POTASSIUM SERPL-SCNC: 4.2 MMOL/L (ref 3.5–5.2)
RBC # BLD AUTO: 5 10*6/MM3 (ref 4.14–5.8)
SODIUM SERPL-SCNC: 139 MMOL/L (ref 136–145)
WBC NRBC COR # BLD: 5.6 10*3/MM3 (ref 3.4–10.8)

## 2022-12-28 PROCEDURE — 85025 COMPLETE CBC W/AUTO DIFF WBC: CPT

## 2022-12-28 PROCEDURE — 80048 BASIC METABOLIC PNL TOTAL CA: CPT

## 2022-12-28 PROCEDURE — 93005 ELECTROCARDIOGRAM TRACING: CPT | Performed by: ORTHOPAEDIC SURGERY

## 2022-12-28 PROCEDURE — 93010 ELECTROCARDIOGRAM REPORT: CPT | Performed by: INTERNAL MEDICINE

## 2022-12-28 PROCEDURE — 36415 COLL VENOUS BLD VENIPUNCTURE: CPT

## 2022-12-28 PROCEDURE — 82040 ASSAY OF SERUM ALBUMIN: CPT

## 2022-12-28 PROCEDURE — 83036 HEMOGLOBIN GLYCOSYLATED A1C: CPT

## 2022-12-28 PROCEDURE — 87641 MR-STAPH DNA AMP PROBE: CPT

## 2023-01-03 LAB — QT INTERVAL: 466 MS

## 2023-01-19 ENCOUNTER — TRANSCRIBE ORDERS (OUTPATIENT)
Dept: PHYSICAL THERAPY | Facility: CLINIC | Age: 53
End: 2023-01-19
Payer: COMMERCIAL

## 2023-01-19 DIAGNOSIS — Z96.652 STATUS POST TOTAL LEFT KNEE REPLACEMENT: Primary | ICD-10-CM

## 2023-02-14 NOTE — PROGRESS NOTES
"Chief Complaint  Sleep Apnea    Subjective          Chalo Hatch presents to [facility] NEUROLOGY  History of Present Illness    Sleep testing history:      last sleep study 2008 - Did not bring chip, no data for download. Overall feels he's sleeping better but wants to get a new machine.    uses cpap nightly, air leak or mask , nasal mask, [facility]  ON CPAP     PAP download: from 2012 was on pressure 9-12 with ahi of about 2.6 and avg use of over 7 hours  Has gained some weight in past two years    [place] Sleepiness Scale:  Sitting and reading 2 WatchingTV 2  Sitting, inactive, in a public place 0  As a passenger in a car for 1 hour w/o a break  0  Lying down to rest in the afternoon  1  Sitting and talking to someone  0  Sitting quietly after a lunch  1  In a car, while stopped for traffic or a light  0  Total 6    Review of Systems   Constitutional: Negative.    HENT: Positive for tinnitus.    Eyes: Negative.    Respiratory: Negative.    Cardiovascular: Negative.    Gastrointestinal: Negative.    Endocrine: Negative.    Genitourinary: Negative.    Musculoskeletal: Negative.    Skin: Negative.    Allergic/Immunologic: Positive for environmental allergies.   Neurological: Negative.    Hematological: Negative.    Psychiatric/Behavioral: Negative.          Objective   Vital Signs:   /85 (BP Location: Left arm, Patient Position: Sitting, Cuff Size: Large Adult)   Pulse 54   Temp 96.9 °F (36.1 °C) (Temporal)   Resp 18   Ht 190.5 cm (75\")   Wt (!) 147 kg (324 lb 6.4 oz)   SpO2 100%   BMI 40.55 kg/m²     Physical Exam  Vitals reviewed.   Cardiovascular:      Rate and Rhythm: Normal rate.      Pulses: Normal pulses.   Pulmonary:      Effort: Pulmonary effort is normal.   Neurological:      General: No focal deficit present.      Mental Status: He is alert.   Psychiatric:         Mood and Affect: Mood normal.        Result Review :                 Assessment and Plan    Diagnoses " and all orders for this visit:    1. Obstructive sleep apnea syndrome (Primary)  -     PAP Therapy    will order new cpap 9-12,    The patient is compliant with and benefiting from PAP therapy.      Follow Up   Return in about 1 year (around 2/20/2024).    Patient was given instructions and counseling regarding his condition or for health maintenance advice. Please see specific information pulled into the AVS if appropriate.       This document has been electronically signed by Joseph Seipel, MD on February 20, 2023 16:00 EST

## 2023-02-17 ENCOUNTER — TRANSCRIBE ORDERS (OUTPATIENT)
Dept: ADMINISTRATIVE | Facility: HOSPITAL | Age: 53
End: 2023-02-17
Payer: COMMERCIAL

## 2023-02-17 ENCOUNTER — HOSPITAL ENCOUNTER (OUTPATIENT)
Dept: CARDIOLOGY | Facility: HOSPITAL | Age: 53
Discharge: HOME | End: 2023-02-17
Payer: COMMERCIAL

## 2023-02-17 ENCOUNTER — LAB (OUTPATIENT)
Dept: LAB | Facility: HOSPITAL | Age: 53
End: 2023-02-17
Payer: COMMERCIAL

## 2023-02-17 DIAGNOSIS — Z01.818 OTHER SPECIFIED PRE-OPERATIVE EXAMINATION: ICD-10-CM

## 2023-02-17 DIAGNOSIS — Z01.818 OTHER SPECIFIED PRE-OPERATIVE EXAMINATION: Primary | ICD-10-CM

## 2023-02-17 LAB
ALBUMIN SERPL-MCNC: 4.5 G/DL (ref 3.5–5.2)
ANION GAP SERPL CALCULATED.3IONS-SCNC: 13 MMOL/L (ref 5–15)
BASOPHILS # BLD AUTO: 0.12 10*3/MM3 (ref 0–0.2)
BASOPHILS NFR BLD AUTO: 1.6 % (ref 0–1.5)
BUN SERPL-MCNC: 24 MG/DL (ref 6–20)
BUN/CREAT SERPL: 17.9 (ref 7–25)
CALCIUM SPEC-SCNC: 9.9 MG/DL (ref 8.6–10.5)
CHLORIDE SERPL-SCNC: 99 MMOL/L (ref 98–107)
CO2 SERPL-SCNC: 26 MMOL/L (ref 22–29)
CREAT SERPL-MCNC: 1.34 MG/DL (ref 0.76–1.27)
DEPRECATED RDW RBC AUTO: 41.2 FL (ref 37–54)
EGFRCR SERPLBLD CKD-EPI 2021: 63.3 ML/MIN/1.73
EOSINOPHIL # BLD AUTO: 0.47 10*3/MM3 (ref 0–0.4)
EOSINOPHIL NFR BLD AUTO: 6.3 % (ref 0.3–6.2)
ERYTHROCYTE [DISTWIDTH] IN BLOOD BY AUTOMATED COUNT: 13.5 % (ref 12.3–15.4)
GLUCOSE SERPL-MCNC: 120 MG/DL (ref 65–99)
HBA1C MFR BLD: 6.8 % (ref 3.5–5.6)
HCT VFR BLD AUTO: 42.8 % (ref 37.5–51)
HGB BLD-MCNC: 14.6 G/DL (ref 13–17.7)
IMM GRANULOCYTES # BLD AUTO: 0.04 10*3/MM3 (ref 0–0.05)
IMM GRANULOCYTES NFR BLD AUTO: 0.5 % (ref 0–0.5)
LYMPHOCYTES # BLD AUTO: 2.73 10*3/MM3 (ref 0.7–3.1)
LYMPHOCYTES NFR BLD AUTO: 36.6 % (ref 19.6–45.3)
MCH RBC QN AUTO: 29.1 PG (ref 26.6–33)
MCHC RBC AUTO-ENTMCNC: 34.1 G/DL (ref 31.5–35.7)
MCV RBC AUTO: 85.4 FL (ref 79–97)
MONOCYTES # BLD AUTO: 0.59 10*3/MM3 (ref 0.1–0.9)
MONOCYTES NFR BLD AUTO: 7.9 % (ref 5–12)
MRSA DNA SPEC QL NAA+PROBE: NORMAL
NEUTROPHILS NFR BLD AUTO: 3.5 10*3/MM3 (ref 1.7–7)
NEUTROPHILS NFR BLD AUTO: 47.1 % (ref 42.7–76)
NRBC BLD AUTO-RTO: 0 /100 WBC (ref 0–0.2)
PLATELET # BLD AUTO: 274 10*3/MM3 (ref 140–450)
PMV BLD AUTO: 8.8 FL (ref 6–12)
POTASSIUM SERPL-SCNC: 3.9 MMOL/L (ref 3.5–5.2)
RBC # BLD AUTO: 5.01 10*6/MM3 (ref 4.14–5.8)
SODIUM SERPL-SCNC: 138 MMOL/L (ref 136–145)
WBC NRBC COR # BLD: 7.45 10*3/MM3 (ref 3.4–10.8)

## 2023-02-17 PROCEDURE — 85025 COMPLETE CBC W/AUTO DIFF WBC: CPT

## 2023-02-17 PROCEDURE — 93005 ELECTROCARDIOGRAM TRACING: CPT | Performed by: ORTHOPAEDIC SURGERY

## 2023-02-17 PROCEDURE — 83036 HEMOGLOBIN GLYCOSYLATED A1C: CPT

## 2023-02-17 PROCEDURE — 82040 ASSAY OF SERUM ALBUMIN: CPT

## 2023-02-17 PROCEDURE — 87641 MR-STAPH DNA AMP PROBE: CPT

## 2023-02-17 PROCEDURE — 36415 COLL VENOUS BLD VENIPUNCTURE: CPT

## 2023-02-17 PROCEDURE — 80048 BASIC METABOLIC PNL TOTAL CA: CPT

## 2023-02-17 PROCEDURE — 93010 ELECTROCARDIOGRAM REPORT: CPT | Performed by: INTERNAL MEDICINE

## 2023-02-20 ENCOUNTER — OFFICE VISIT (OUTPATIENT)
Dept: NEUROLOGY | Facility: CLINIC | Age: 53
End: 2023-02-20
Payer: COMMERCIAL

## 2023-02-20 VITALS
TEMPERATURE: 96.9 F | HEART RATE: 54 BPM | WEIGHT: 315 LBS | HEIGHT: 75 IN | BODY MASS INDEX: 39.17 KG/M2 | RESPIRATION RATE: 18 BRPM | SYSTOLIC BLOOD PRESSURE: 138 MMHG | OXYGEN SATURATION: 100 % | DIASTOLIC BLOOD PRESSURE: 85 MMHG

## 2023-02-20 DIAGNOSIS — G47.33 OBSTRUCTIVE SLEEP APNEA SYNDROME: Primary | ICD-10-CM

## 2023-02-20 PROCEDURE — 99213 OFFICE O/P EST LOW 20 MIN: CPT | Performed by: PSYCHIATRY & NEUROLOGY

## 2023-02-27 LAB — QT INTERVAL: 426 MS

## 2023-02-28 ENCOUNTER — TELEPHONE (OUTPATIENT)
Dept: NEUROLOGY | Facility: CLINIC | Age: 53
End: 2023-02-28

## 2023-02-28 NOTE — TELEPHONE ENCOUNTER
Caller: WANDY    Relationship: W/ [employer]    Best call back number: [phone]    What form or medical record are you requesting: COPY OF PT'S INSURANCE CARD    How would you like to receive the form or medical records (pick-up, mail, fax): FAX  If fax, what is the fax number: [phone] ATTN: WANDY    Timeframe paperwork needed: EARLIEST CONVENIENCE    PLEASE REVIEW AND ADVISE.

## 2023-03-08 ENCOUNTER — TELEPHONE (OUTPATIENT)
Dept: NEUROLOGY | Facility: CLINIC | Age: 53
End: 2023-03-08
Payer: COMMERCIAL

## 2023-03-08 NOTE — TELEPHONE ENCOUNTER
Provider: SEIPEL, JOSEPH, MD    Caller: DANIEL      Relationship to Patient: SELF    Phone Number: [phone]    Reason for Call: PT WOULD LIKE HIS CPAP ORDER TO GO TO [facility] [phone].

## 2023-03-10 NOTE — TELEPHONE ENCOUNTER
Provider: SEIPEL, JOSEPH, MD    Caller: DANIEL    Relationship to Patient: SELF    Phone Number: [phone]    Reason for Call: PT CALLING AGAIN REGARDING THE MESSAGE.   STATED THAT [facility] HAS NOT RECEIVED THE ORDER FOR THE CPAP.    PLEASE ADVISE

## 2023-03-22 NOTE — TELEPHONE ENCOUNTER
PT SAID KHANH  NEEDS  DX SLEEP STUDY FAXED ALONG WITH OFFICE NOTE SO HE CAN GET HIS C-PAP MACHINE     WOULD LIKE TO GET ASAP AS HAS BEEN WAITING SINCE RECALL      CHECK TO MAKE SURE C-PAP ORDER WAS PLACED AS WELL TO [facility] [place]     PLEASE CALL PT TO LET HIM KNOW THIS WAS TAKEN CARE [phone]      PLEASE ADVISE

## 2023-03-24 RX ORDER — HYDRALAZINE HYDROCHLORIDE 50 MG/1
TABLET, FILM COATED ORAL
Qty: 180 TABLET | Refills: 2 | Status: SHIPPED | OUTPATIENT
Start: 2023-03-24

## 2023-03-24 NOTE — TELEPHONE ENCOUNTER
Rx Refill Note  Requested Prescriptions     Pending Prescriptions Disp Refills   • hydrALAZINE (APRESOLINE) 50 MG tablet [Pharmacy Med Name: HYDRALAZINE 50 MG TABLET] 180 tablet 2     Sig: TAKE 1 TABLET BY MOUTH TWICE A DAY      Last office visit with prescribing clinician: 1/31/2022     Next office visit with prescribing clinician: 4/3/2023                      Keshia Lindsay MA  03/24/23, 07:42 EDT

## 2023-03-27 ENCOUNTER — TELEPHONE (OUTPATIENT)
Dept: NEUROLOGY | Facility: CLINIC | Age: 53
End: 2023-03-27
Payer: COMMERCIAL

## 2023-03-27 DIAGNOSIS — G47.33 OBSTRUCTIVE SLEEP APNEA: Primary | ICD-10-CM

## 2023-04-07 ENCOUNTER — TELEPHONE (OUTPATIENT)
Dept: CARDIOLOGY | Facility: CLINIC | Age: 53
End: 2023-04-07

## 2023-04-07 ENCOUNTER — TELEPHONE (OUTPATIENT)
Dept: CARDIOLOGY | Facility: CLINIC | Age: 53
End: 2023-04-07
Payer: COMMERCIAL

## 2023-04-07 NOTE — TELEPHONE ENCOUNTER
Caller: Chalo Hatch    Relationship: Self    Best call back number: [phone]    Who is your current provider: DR BASURTO    Who would you like your new provider to be: DR METCALF    What are your reasons for transferring care: PATIENT PREFERENCE

## 2023-04-07 NOTE — TELEPHONE ENCOUNTER
Caller: Chalo Hatch    Relationship: Self    Best call back number: [phone]    Who is your current provider: DR BASURTO    Who would you like your new provider to be: DR METCALF    What are your reasons for transferring care: PATIENT HAS ONLY SEEN DOCTOR BASURTO ONCE AND WOULD LIKE TO REQUEST CHANGE TO DR METCALF AS A MATTER OF PREFERENCE.

## 2023-04-18 ENCOUNTER — OFFICE VISIT (OUTPATIENT)
Dept: CARDIOLOGY | Facility: CLINIC | Age: 53
End: 2023-04-18
Payer: COMMERCIAL

## 2023-04-18 VITALS
DIASTOLIC BLOOD PRESSURE: 74 MMHG | HEIGHT: 75 IN | BODY MASS INDEX: 39.17 KG/M2 | HEART RATE: 57 BPM | WEIGHT: 315 LBS | OXYGEN SATURATION: 94 % | SYSTOLIC BLOOD PRESSURE: 124 MMHG

## 2023-04-18 DIAGNOSIS — I10 ESSENTIAL (PRIMARY) HYPERTENSION: ICD-10-CM

## 2023-04-18 DIAGNOSIS — I49.3 PVCS (PREMATURE VENTRICULAR CONTRACTIONS): ICD-10-CM

## 2023-04-18 DIAGNOSIS — R09.89 LABILE HYPERTENSION: Primary | ICD-10-CM

## 2023-04-18 DIAGNOSIS — E78.5 DYSLIPIDEMIA: ICD-10-CM

## 2023-04-18 DIAGNOSIS — R00.2 PALPITATIONS: ICD-10-CM

## 2023-04-18 RX ORDER — SALICYLIC ACID 40 %
ADHESIVE PATCH, MEDICATED TOPICAL
COMMUNITY
Start: 2023-03-03 | End: 2023-04-18

## 2023-04-18 RX ORDER — MELATONIN
1000 DAILY
COMMUNITY

## 2023-04-19 RX ORDER — BISOPROLOL FUMARATE 5 MG/1
5 TABLET, FILM COATED ORAL DAILY
Qty: 90 TABLET | Refills: 3 | Status: SHIPPED | OUTPATIENT
Start: 2023-04-19

## 2023-04-19 NOTE — PROGRESS NOTES
Cardiology Clinic Note  Jean Paul Mazariegos MD, PhD    Subjective:     Encounter Date:04/18/2023      Patient ID: Chalo Hacth is a 53 y.o. male.    Chief Complaint:  Chief Complaint   Patient presents with   • Palpitations   • Hypertension   • Follow-up       HPI:    I the pleasure to see this 53-year-old gentleman as a new patient today wishing to switch care to our clinic.  He is previously followed with palpitations, labile hypertension, obesity, history of PVCs, dyslipidemia, he has no anginal chest pain, he has had reassuring work-ups in the past with preserved EF 50 to 55%, normal stress testing 2021 with no ischemia with preserved EF, he has variable attempts at diet and weight loss we discussed low-carb diet, strategies for weight loss, combination with daily exercise, caloric restriction, antihypertensive therapies, goals for heart rate blood pressure cholesterol today for primary and secondary prevention.  He has some low heart rates on bisoprolol in the 50s getting some fatigue, we discussed decreasing his bisoprolol to 5 daily to allow higher heart rates with preserved EF, no history of MI, we would prefer rather than increase afterload reduction with hydralazine or other agents to continue with diet weight loss efforts will decrease blood pressures allow possible cessation or de-escalation of antihypertensive therapy which she would be happy about.  He has had prior extreme swelling with amlodipine, he is status post bilateral knee surgeries with only trace ankle edema.  No other presyncope, heart failure signs or symptoms or unstable angina    Review of systems otherwise negative x14 point review of systems except as mentioned above    Historical data copied forward from previous encounters in EMR including the history, exam, and assessment/plan has been reviewed and is unchanged unless noted otherwise.    Cardiac medicines reviewed with risk, benefits, and necessity of each discussed.    Risk and  "benefit of cardiac testing reviewed including death heart attack stroke pain bleeding infection need for vascular /cardiovascular surgery were discussed and the patient     Objective:         /74 (BP Location: Right arm, Patient Position: Sitting, Cuff Size: Large Adult)   Pulse 57   Ht 190.5 cm (75\")   Wt (!) 145 kg (319 lb)   SpO2 94%   BMI 39.87 kg/m²     Physical Exam  Bradycardic, regular, no rubs gallops heave or lift, distant heart sounds  Obese soft nontender nondistended  BMI 39  Clear to auscultation  Trace edema ankles  Bilateral scars in his knees well-healed  Intact grossly  No carotid bruits or JVD  Normal radial pulses  Skin is warm and dry normal cap refill    Assessment:         Essential hypertension  Medication induced bradycardia  Obesity BMI 39  Shortness of breath dyspnea on exertion    Unremarkable stress with preserved EF 2021  Primary and secondary prevention goals discussed  Decrease bisoprolol to 5 daily to improve bradycardia  Continue benazepril hydralazine triamterene HCTZ for goal blood pressure less than 135 systolic  Diet and exercise weight loss goal BMI really less than 30, short-term get less than 35, try to get weight 10% reduction over the next 6 months  Strategies discussed    See him back in 6 months try to further optimize risk factors, reassess blood pressures, weight and symptoms    Jean Paul Mazariegos MD, PhD      The pleasure to be involved in this patient's cardiovascular care.  Please call with any questions or concerns  Jean Paul Mazariegos MD, PhD    Most recent EKG as reviewed and interpreted by me:  Procedures     Most recent echo as reviewed and interpreted by me:  Results for orders placed during the hospital encounter of 02/26/20    Adult Transthoracic Echo Complete W/ Cont if Necessary Per Protocol    Interpretation Summary  · Estimated EF = 50%.  · Left ventricular systolic function is normal.  · Endocardial borders were not seen clearly probably low normal LV " systolic function    Technically difficult study endocardial borders were not seen clearly  Overall most probably preserved LV systolic function EF is around 50%  Calcified aortic valve especially the non-cusp without any significant aortic stenosis  Interatrial septum is intact left atrium is enlarged right atrium is at the prominence of normal  No intracardiac thrombus noted  Aortic root diameter is normal  No significant pericardial effusion noted  Pulmonary leaflets were not seen clearly  Mild mitral calcification without any significant mitral insufficiency      Most recent stress test as reviewed and interpreted by me:  Results for orders placed during the hospital encounter of 02/26/20    Treadmill Stress Test    Interpretation Summary  · The patient reported dyspnea during the stress test.      Most recent cardiac catheterization as reviewed interpreted by me:  No results found for this or any previous visit.    The following portions of the patient's history were reviewed and updated as appropriate: allergies, current medications, past family history, past medical history, past social history, past surgical history and problem list.      ROS:  14 point review of systems negative except as mentioned above    Current Outpatient Medications:   •  B Complex Vitamins (B COMPLEX 100 PO), Take  by mouth., Disp: , Rfl:   •  benazepril (LOTENSIN) 40 MG tablet, TAKE 1 TABLET BY MOUTH EVERY DAY, Disp: 90 tablet, Rfl: 1  •  bisoprolol (ZEBeta) 10 MG tablet, Take 1 tablet by mouth Daily. (Patient taking differently: Take 5 mg by mouth Daily.), Disp: 90 tablet, Rfl: 3  •  cetirizine (zyrTEC) 10 MG tablet, Take 1 tablet by mouth every night at bedtime., Disp: , Rfl:   •  Cholecalciferol 25 MCG (1000 UT) tablet, Take 1 tablet by mouth Daily., Disp: , Rfl:   •  hydrALAZINE (APRESOLINE) 50 MG tablet, TAKE 1 TABLET BY MOUTH TWICE A DAY, Disp: 180 tablet, Rfl: 2  •  levothyroxine (SYNTHROID, LEVOTHROID) 75 MCG tablet, , Disp:  , Rfl:   •  Omega-3 Fatty Acids (FISH OIL) 1000 MG capsule capsule, Take 1 capsule by mouth Daily., Disp: , Rfl:   •  triamterene-hydrochlorothiazide (DYAZIDE) 37.5-25 MG per capsule, , Disp: , Rfl:     Problem List:  Patient Active Problem List   Diagnosis   • Dyslipidemia   • Essential (primary) hypertension   • Obesity   • PVCs (premature ventricular contractions)   • Sleep apnea     Past Medical History:  Past Medical History:   Diagnosis Date   • Chronic renal insufficiency    • Dyslipidemia    • Hyperlipidemia    • Hypertension    • Obesity    • PVC (premature ventricular contraction)     Hx of   • PVC (premature ventricular contraction)    • Seasonal allergies    • Sleep apnea     Using CPAP     Past Surgical History:  Past Surgical History:   Procedure Laterality Date   • REPLACEMENT TOTAL KNEE Left 2023   • VASECTOMY       Social History:  Social History     Socioeconomic History   • Marital status:    Tobacco Use   • Smoking status: Former     Packs/day: 1.00     Types: Cigarettes     Quit date:      Years since quittin.3   • Smokeless tobacco: Never   Vaping Use   • Vaping Use: Never used   Substance and Sexual Activity   • Alcohol use: Yes     Alcohol/week: 3.0 - 4.0 standard drinks     Types: 3 - 4 Cans of beer per week     Comment: Social   • Drug use: Never   • Sexual activity: Defer     Allergies:  Allergies   Allergen Reactions   • Hydrocodone-Acetaminophen Other (See Comments)     Cold; sweaty     Immunizations:    There is no immunization history on file for this patient.         In-Office Procedure(s):  No orders to display        ASCVD RIsk Score::  The ASCVD Risk score (Mary DK, et al., 2019) failed to calculate for the following reasons:    Cannot find a previous HDL lab    Cannot find a previous total cholesterol lab    Imaging:                 Lab Review:   Hospital Outpatient Visit on 2023   Component Date Value   • QT Interval 2023 426    Lab on  02/17/2023   Component Date Value   • Albumin 02/17/2023 4.5    • Hemoglobin A1C 02/17/2023 6.8 (H)    • Glucose 02/17/2023 120 (H)    • BUN 02/17/2023 24 (H)    • Creatinine 02/17/2023 1.34 (H)    • Sodium 02/17/2023 138    • Potassium 02/17/2023 3.9    • Chloride 02/17/2023 99    • CO2 02/17/2023 26.0    • Calcium 02/17/2023 9.9    • BUN/Creatinine Ratio 02/17/2023 17.9    • Anion Gap 02/17/2023 13.0    • eGFR 02/17/2023 63.3    • WBC 02/17/2023 7.45    • RBC 02/17/2023 5.01    • Hemoglobin 02/17/2023 14.6    • Hematocrit 02/17/2023 42.8    • MCV 02/17/2023 85.4    • MCH 02/17/2023 29.1    • MCHC 02/17/2023 34.1    • RDW 02/17/2023 13.5    • RDW-SD 02/17/2023 41.2    • MPV 02/17/2023 8.8    • Platelets 02/17/2023 274    • Neutrophil % 02/17/2023 47.1    • Lymphocyte % 02/17/2023 36.6    • Monocyte % 02/17/2023 7.9    • Eosinophil % 02/17/2023 6.3 (H)    • Basophil % 02/17/2023 1.6 (H)    • Immature Grans % 02/17/2023 0.5    • Neutrophils, Absolute 02/17/2023 3.50    • Lymphocytes, Absolute 02/17/2023 2.73    • Monocytes, Absolute 02/17/2023 0.59    • Eosinophils, Absolute 02/17/2023 0.47 (H)    • Basophils, Absolute 02/17/2023 0.12    • Immature Grans, Absolute 02/17/2023 0.04    • nRBC 02/17/2023 0.0    • MRSA PCR 02/17/2023 No MRSA Detected    Hospital Outpatient Visit on 12/28/2022   Component Date Value   • QT Interval 12/28/2022 466    Lab on 12/28/2022   Component Date Value   • Glucose 12/28/2022 118 (H)    • BUN 12/28/2022 17    • Creatinine 12/28/2022 1.16    • Sodium 12/28/2022 139    • Potassium 12/28/2022 4.2    • Chloride 12/28/2022 104    • CO2 12/28/2022 27.3    • Calcium 12/28/2022 9.5    • BUN/Creatinine Ratio 12/28/2022 14.7    • Anion Gap 12/28/2022 7.7    • eGFR 12/28/2022 75.8    • Albumin 12/28/2022 4.0    • Hemoglobin A1C 12/28/2022 6.3 (H)    • WBC 12/28/2022 5.60    • RBC 12/28/2022 5.00    • Hemoglobin 12/28/2022 14.7    • Hematocrit 12/28/2022 43.6    • MCV 12/28/2022 87.2    • MCH  12/28/2022 29.4    • MCHC 12/28/2022 33.7    • RDW 12/28/2022 13.0    • RDW-SD 12/28/2022 41.0    • MPV 12/28/2022 9.1    • Platelets 12/28/2022 208    • Neutrophil % 12/28/2022 45.0    • Lymphocyte % 12/28/2022 39.6    • Monocyte % 12/28/2022 8.8    • Eosinophil % 12/28/2022 4.6    • Basophil % 12/28/2022 1.6 (H)    • Immature Grans % 12/28/2022 0.4    • Neutrophils, Absolute 12/28/2022 2.52    • Lymphocytes, Absolute 12/28/2022 2.22    • Monocytes, Absolute 12/28/2022 0.49    • Eosinophils, Absolute 12/28/2022 0.26    • Basophils, Absolute 12/28/2022 0.09    • Immature Grans, Absolute 12/28/2022 0.02    • nRBC 12/28/2022 0.0    • MRSA PCR 12/28/2022 No MRSA Detected      Recent labs reviewed and interpreted for clinical significance and application            Level of Care:           Jean Paul Mazariegos MD  04/19/23  .

## 2023-04-20 ENCOUNTER — TELEPHONE (OUTPATIENT)
Dept: NEUROLOGY | Facility: CLINIC | Age: 53
End: 2023-04-20
Payer: COMMERCIAL

## 2023-04-20 DIAGNOSIS — G47.33 OBSTRUCTIVE SLEEP APNEA: Primary | ICD-10-CM

## 2023-04-20 NOTE — TELEPHONE ENCOUNTER
Caller: Chalo Hatch    Relationship: Self    Best call back number: [phone]    What is the best time to reach you: ANY     Who are you requesting to speak with (clinical staff, provider,  specific staff member): SEIPEL    What was the call regarding: PATIENT TELEPHONED TO [facility] IS SAYING HE NEEDS NEW SLEEP STUDY DONE. THEY CAN NOT READ THE PREV. SLEEP STUDY THAT WAS SENT BY [facility]    PLEASE ORDER NEW STUDY OR CALL TO ADVISE    THANK YOU

## 2023-10-18 ENCOUNTER — OFFICE VISIT (OUTPATIENT)
Dept: CARDIOLOGY | Facility: CLINIC | Age: 53
End: 2023-10-18
Payer: COMMERCIAL

## 2023-10-18 VITALS
HEIGHT: 75 IN | BODY MASS INDEX: 39.17 KG/M2 | RESPIRATION RATE: 18 BRPM | HEART RATE: 47 BPM | WEIGHT: 315 LBS | SYSTOLIC BLOOD PRESSURE: 148 MMHG | DIASTOLIC BLOOD PRESSURE: 79 MMHG

## 2023-10-18 DIAGNOSIS — I49.3 PVCS (PREMATURE VENTRICULAR CONTRACTIONS): ICD-10-CM

## 2023-10-18 DIAGNOSIS — I10 ESSENTIAL (PRIMARY) HYPERTENSION: ICD-10-CM

## 2023-10-18 DIAGNOSIS — R00.2 PALPITATIONS: ICD-10-CM

## 2023-10-18 DIAGNOSIS — R09.89 LABILE HYPERTENSION: Primary | ICD-10-CM

## 2023-10-18 DIAGNOSIS — E78.5 DYSLIPIDEMIA: ICD-10-CM

## 2023-10-18 PROCEDURE — 99214 OFFICE O/P EST MOD 30 MIN: CPT | Performed by: INTERNAL MEDICINE

## 2023-10-18 RX ORDER — HYDRALAZINE HYDROCHLORIDE 100 MG/1
100 TABLET, FILM COATED ORAL 2 TIMES DAILY
Qty: 60 TABLET | Refills: 3 | Status: SHIPPED | OUTPATIENT
Start: 2023-10-18

## 2023-10-18 RX ORDER — MONTELUKAST SODIUM 10 MG/1
TABLET ORAL
COMMUNITY
Start: 2023-10-16

## 2023-10-18 NOTE — PROGRESS NOTES
Cardiology Clinic Note  Jean Paul Mazariegos MD, PhD    Subjective:     Encounter Date:10/18/2023      Patient ID: Chalo Hatch is a 53 y.o. male.    Chief Complaint:  Chief Complaint   Patient presents with    Follow-up       HPI:      I the pleasure to see this 53-year-old gentleman in follow-up today.  He is previously followed with palpitations, labile hypertension, obesity, history of PVCs, dyslipidemia, he has no anginal chest pain, he has had reassuring work-ups in the past with preserved EF 50 to 55%, normal stress testing 2021 with no ischemia with preserved EF, he has variable attempts at diet and weight loss we discussed low-carb diet, strategies for weight loss, combination with daily exercise, caloric restriction, antihypertensive therapies, goals for heart rate blood pressure cholesterol today for primary and secondary prevention.  He has some low heart rates on bisoprolol in the 50s getting some exertional fatigue, we decreased his bisoprolol to 5 daily to allow higher heart rates with preserved EF, no history of MI, we would prefer rather than increase afterload reduction with hydralazine or other agents to continue with diet weight loss efforts will decrease blood pressures allow possible cessation or de-escalation of antihypertensive therapy which she would be happy about.  He has had prior extreme swelling with amlodipine, he is status post bilateral knee surgeries with only trace ankle edema.  No other presyncope, heart failure signs or symptoms or unstable angina.  His heart rate has not improved much 47 today on encounter despite lower dose.  We discussed cessation of all beta-blockers AV roosevelt blockers today, otherwise he is high functioning doing well NYHA class I CCS class I.  We discussed diet and exercise weight loss and caloric restriction.  He remains overweight     Review of systems otherwise negative x14 point review of systems except as mentioned above     Historical data copied forward  "from previous encounters in EMR including the history, exam, and assessment/plan has been reviewed and is unchanged unless noted otherwise.     Cardiac medicines reviewed with risk, benefits, and necessity of each discussed.     Risk and benefit of cardiac testing reviewed including death heart attack stroke pain bleeding infection need for vascular /cardiovascular surgery were discussed and the patient      Objective:         Vitals reviewed below     Physical Exam  Bradycardic, regular, no rubs gallops heave or lift, distant heart sounds  Obese soft nontender nondistended  BMI 39  Clear to auscultation  Trace edema ankles  Bilateral scars in his knees well-healed  Intact grossly  No carotid bruits or JVD  Normal radial pulses  Skin is warm and dry normal cap refill     Assessment:         Assessment   Essential hypertension  Medication induced bradycardia  Obesity BMI 39  Shortness of breath dyspnea on exertion  Bradycardia    Stop bisoprolol completely with continued heart rates in the 40s  Continue home blood pressure log twice daily  Benazepril hydralazine triamterene HCTZ will continue  Continue efforts at weight loss diet and exercise    Diet and exercise weight loss goal BMI really less than 30, short-term get less than 35, try to get weight 10% reduction over the next 6 months  Strategies discussed     See him back in 6 months try to further optimize risk factors, reassess blood pressures, weight and symptoms    Home blood pressure log to twice daily, cough clinic for any dizziness lightheadedness syncope or other complaints.  Advise he continue off beta-blockers indefinitely  He has bradycardia and any symptomatic issues with exertional fatigue syncope presyncope he may need pacemaker in the future     Jean Paul Mazariegos MD, PhD       Objective:         /79 (BP Location: Right arm, Patient Position: Sitting)   Pulse (!) 47   Resp 18   Ht 190.5 cm (75\")   Wt (!) 146 kg (322 lb)   BMI 40.25 kg/m² "           The pleasure to be involved in this patient's cardiovascular care.  Please call with any questions or concerns  Jean Paul Mazariegos MD, PhD    Most recent EKG as reviewed and interpreted by me:  Procedures     Most recent echo as reviewed and interpreted by me:  Results for orders placed during the hospital encounter of 02/26/20    Adult Transthoracic Echo Complete W/ Cont if Necessary Per Protocol    Interpretation Summary  · Estimated EF = 50%.  · Left ventricular systolic function is normal.  · Endocardial borders were not seen clearly probably low normal LV systolic function    Technically difficult study endocardial borders were not seen clearly  Overall most probably preserved LV systolic function EF is around 50%  Calcified aortic valve especially the non-cusp without any significant aortic stenosis  Interatrial septum is intact left atrium is enlarged right atrium is at the prominence of normal  No intracardiac thrombus noted  Aortic root diameter is normal  No significant pericardial effusion noted  Pulmonary leaflets were not seen clearly  Mild mitral calcification without any significant mitral insufficiency      Most recent stress test as reviewed and interpreted by me:  Results for orders placed during the hospital encounter of 02/26/20    Treadmill Stress Test    Interpretation Summary  · The patient reported dyspnea during the stress test.      Most recent cardiac catheterization as reviewed interpreted by me:  No results found for this or any previous visit.    The following portions of the patient's history were reviewed and updated as appropriate: allergies, current medications, past family history, past medical history, past social history, past surgical history, and problem list.      ROS:  14 point review of systems negative except as mentioned above    Current Outpatient Medications:     B Complex Vitamins (B COMPLEX 100 PO), Take  by mouth., Disp: , Rfl:     benazepril (LOTENSIN) 40 MG  tablet, TAKE 1 TABLET BY MOUTH EVERY DAY, Disp: 90 tablet, Rfl: 1    bisoprolol (ZEBeta) 5 MG tablet, Take 1 tablet by mouth Daily., Disp: 90 tablet, Rfl: 3    cetirizine (zyrTEC) 10 MG tablet, Take 1 tablet by mouth every night at bedtime., Disp: , Rfl:     Cholecalciferol 25 MCG (1000 UT) tablet, Take 1 tablet by mouth Daily., Disp: , Rfl:     hydrALAZINE (APRESOLINE) 50 MG tablet, TAKE 1 TABLET BY MOUTH TWICE A DAY, Disp: 180 tablet, Rfl: 2    levothyroxine (SYNTHROID, LEVOTHROID) 75 MCG tablet, , Disp: , Rfl:     montelukast (SINGULAIR) 10 MG tablet, , Disp: , Rfl:     Omega-3 Fatty Acids (FISH OIL) 1000 MG capsule capsule, Take 1 capsule by mouth Daily., Disp: , Rfl:     triamterene-hydrochlorothiazide (DYAZIDE) 37.5-25 MG per capsule, , Disp: , Rfl:     Problem List:  Patient Active Problem List   Diagnosis    Dyslipidemia    Essential (primary) hypertension    Obesity    PVCs (premature ventricular contractions)    Sleep apnea     Past Medical History:  Past Medical History:   Diagnosis Date    Chronic renal insufficiency     Dyslipidemia     Hyperlipidemia     Hypertension     Obesity     PVC (premature ventricular contraction)     Hx of    PVC (premature ventricular contraction)     Seasonal allergies     Sleep apnea     Using CPAP     Past Surgical History:  Past Surgical History:   Procedure Laterality Date    REPLACEMENT TOTAL KNEE Left 2023    VASECTOMY       Social History:  Social History     Socioeconomic History    Marital status:    Tobacco Use    Smoking status: Former     Packs/day: 1     Types: Cigarettes     Quit date:      Years since quittin.8    Smokeless tobacco: Never   Vaping Use    Vaping Use: Never used   Substance and Sexual Activity    Alcohol use: Yes     Alcohol/week: 3.0 - 4.0 standard drinks of alcohol     Types: 3 - 4 Cans of beer per week     Comment: Social    Drug use: Never    Sexual activity: Defer     Allergies:  Allergies   Allergen Reactions     Hydrocodone-Acetaminophen Other (See Comments)     Cold; sweaty     Immunizations:    There is no immunization history on file for this patient.         In-Office Procedure(s):  No orders to display        ASCVD RIsk Score::  The ASCVD Risk score (Mary THOMPSON, et al., 2019) failed to calculate for the following reasons:    Cannot find a previous HDL lab    Cannot find a previous total cholesterol lab    Imaging:                 Lab Review:   No visits with results within 6 Month(s) from this visit.   Latest known visit with results is:   Hospital Outpatient Visit on 02/17/2023   Component Date Value    QT Interval 02/17/2023 426      Recent labs reviewed and interpreted for clinical significance and application            Level of Care:           Jean Paul Mazariegos MD  10/18/23  .

## 2023-11-22 ENCOUNTER — TELEMEDICINE (OUTPATIENT)
Dept: FAMILY MEDICINE CLINIC | Facility: TELEHEALTH | Age: 53
End: 2023-11-22
Payer: COMMERCIAL

## 2023-11-22 VITALS — TEMPERATURE: 98 F

## 2023-11-22 DIAGNOSIS — J22 LOWER RESPIRATORY INFECTION (E.G., BRONCHITIS, PNEUMONIA, PNEUMONITIS, PULMONITIS): Primary | ICD-10-CM

## 2023-11-22 RX ORDER — AZITHROMYCIN 250 MG/1
TABLET, FILM COATED ORAL
Qty: 6 TABLET | Refills: 0 | Status: SHIPPED | OUTPATIENT
Start: 2023-11-22

## 2023-11-22 RX ORDER — METHYLPREDNISOLONE 4 MG/1
TABLET ORAL
Qty: 21 TABLET | Refills: 0 | Status: SHIPPED | OUTPATIENT
Start: 2023-11-22

## 2023-11-22 RX ORDER — BENZONATATE 200 MG/1
200 CAPSULE ORAL 3 TIMES DAILY PRN
Qty: 21 CAPSULE | Refills: 0 | Status: SHIPPED | OUTPATIENT
Start: 2023-11-22

## 2023-11-22 RX ORDER — DICLOFENAC SODIUM AND MISOPROSTOL 75; 200 MG/1; UG/1
1 TABLET, DELAYED RELEASE ORAL EVERY 12 HOURS SCHEDULED
COMMUNITY
Start: 2023-11-13

## 2023-11-22 RX ORDER — ALBUTEROL SULFATE 90 UG/1
2 AEROSOL, METERED RESPIRATORY (INHALATION) EVERY 4 HOURS PRN
Qty: 18 G | Refills: 0 | Status: SHIPPED | OUTPATIENT
Start: 2023-11-22

## 2023-11-22 NOTE — PROGRESS NOTES
You have chosen to receive care through a telehealth visit.  Do you consent to use a video/audio connection for your medical care today? Yes     CHIEF COMPLAINT  No chief complaint on file.        HPI  Chalo Hatch is a 53 y.o. male  presents with complaint of 2 day history of productive cough with green/brown sputum, wheezing, mild sore throat.  Denies fever, head congestion, shortness of breath.      Negative at home COVID test, but test was out of date, so he would like to be checked again to ensure that he is not contagious for Thanksgiving.     Review of Systems  See HPI    Past Medical History:   Diagnosis Date    Chronic renal insufficiency     Dyslipidemia     Hyperlipidemia     Hypertension     Obesity     PVC (premature ventricular contraction)     Hx of    PVC (premature ventricular contraction)     Seasonal allergies     Sleep apnea     Using CPAP       Family History   Problem Relation Age of Onset    Hypertension Mother     Clotting disorder Mother     Hypertension Father     No Known Problems Sister     No Known Problems Brother     No Known Problems Maternal Aunt     No Known Problems Maternal Uncle     No Known Problems Paternal Aunt     No Known Problems Paternal Uncle     No Known Problems Maternal Grandmother     No Known Problems Maternal Grandfather     No Known Problems Paternal Grandmother     Diabetes Paternal Grandfather     No Known Problems Other     Anemia Neg Hx     Arrhythmia Neg Hx     Asthma Neg Hx     Fainting Neg Hx     Heart attack Neg Hx     Heart disease Neg Hx     Heart failure Neg Hx     Hyperlipidemia Neg Hx        Social History     Socioeconomic History    Marital status:    Tobacco Use    Smoking status: Former     Packs/day: 1     Types: Cigarettes     Quit date:      Years since quittin.8    Smokeless tobacco: Never   Vaping Use    Vaping Use: Never used   Substance and Sexual Activity    Alcohol use: Yes     Alcohol/week: 3.0 - 4.0 standard drinks of  alcohol     Types: 3 - 4 Cans of beer per week     Comment: Social    Drug use: Never    Sexual activity: Defer       Chalo Hatch  reports that he quit smoking about 13 years ago. His smoking use included cigarettes. He smoked an average of 1 pack per day. He has never used smokeless tobacco..               Temp 98 °F (36.7 °C)     PHYSICAL EXAM  Physical Exam   Constitutional: He is oriented to person, place, and time. He appears well-developed and well-nourished. He does not have a sickly appearance. He does not appear ill.   HENT:   Head: Normocephalic and atraumatic.   Pulmonary/Chest: Effort normal.  No respiratory distress (persistent cough).  Neurological: He is alert and oriented to person, place, and time.       Results for orders placed or performed during the hospital encounter of 02/17/23   ECG 12 Lead   Result Value Ref Range    QT Interval 426 ms       Diagnoses and all orders for this visit:    1. Lower respiratory infection (e.g., bronchitis, pneumonia, pneumonitis, pulmonitis) (Primary)  -     BALDEMAR FLU + SARS PCR; Future  -     azithromycin (Zithromax Z-Kirk) 250 MG tablet; Take 2 tablets by mouth on day 1, then 1 tablet daily on days 2-5  Dispense: 6 tablet; Refill: 0  -     methylPREDNISolone (MEDROL) 4 MG dose pack; Take as directed on package instructions.  Dispense: 21 tablet; Refill: 0  -     albuterol sulfate  (90 Base) MCG/ACT inhaler; Inhale 2 puffs Every 4 (Four) Hours As Needed for Wheezing.  Dispense: 18 g; Refill: 0  -     benzonatate (TESSALON) 200 MG capsule; Take 1 capsule by mouth 3 (Three) Times a Day As Needed for Cough.  Dispense: 21 capsule; Refill: 0    --take medications as prescribed  --increase fluids, rest as needed, tylenol or ibuprofen for pain  --I will notify him of COVID and flu test result as soon as results are available.  --f/u in 5-7 days if no improvement        FOLLOW-UP  As discussed during visit with PCP/[facility] if no improvement or Urgent  Care/Emergency Department if worsening of symptoms    Patient verbalizes understanding of medication dosage, comfort measures, instructions for treatment and follow-up.    Kait Velasquez, APRN  11/22/2023  11:28 EST    The use of a video visit has been reviewed with the patient and verbal informed consent has been obtained. Myself and Chalo Hatch participated in this visit. The patient is located in [address] [place] [address] [place] [facility].    I am located in [place], [place]. [employer] and [employer] were utilized. I spent 8 minutes in the patient's chart for this visit.

## 2023-11-22 NOTE — PATIENT INSTRUCTIONS
Acute Bronchitis, Adult    Acute bronchitis is sudden inflammation of the main airways (bronchi) that come off the windpipe (trachea) in the lungs. The swelling causes the airways to get smaller and make more mucus than normal. This can make it hard to breathe and can cause coughing or noisy breathing (wheezing).  Acute bronchitis may last several weeks. The cough may last longer. Allergies, asthma, and exposure to smoke may make the condition worse.  What are the causes?  This condition can be caused by germs and by substances that irritate the lungs, including:  Cold and flu viruses. The most common cause of this condition is the virus that causes the common cold.  Bacteria. This is less common.  Breathing in substances that irritate the lungs, including:  Smoke from cigarettes and other forms of tobacco.  Dust and pollen.  Fumes from household cleaning products, gases, or burned fuel.  Indoor or outdoor air pollution.  What increases the risk?  The following factors may make you more likely to develop this condition:  A weak body's defense system, also called the immune system.  A condition that affects your lungs and breathing, such as asthma.  What are the signs or symptoms?  Common symptoms of this condition include:  Coughing. This may bring up clear, yellow, or green mucus from your lungs (sputum).  Wheezing.  Runny or stuffy nose.  Having too much mucus in your lungs (chest congestion).  Shortness of breath.  Aches and pains, including sore throat or chest.  How is this diagnosed?  This condition is usually diagnosed based on:  Your symptoms and medical history.  A physical exam.  You may also have other tests, including tests to rule out other conditions, such as pneumonia. These tests include:  A test of lung function.  Test of a mucus sample to look for the presence of bacteria.  Tests to check the oxygen level in your blood.  Blood tests.  Chest X-ray.  How is this treated?  Most cases of acute  bronchitis clear up over time without treatment. Your health care provider may recommend:  Drinking more fluids to help thin your mucus so it is easier to cough up.  Taking inhaled medicine (inhaler) to improve air flow in and out of your lungs.  Using a vaporizer or a humidifier. These are machines that add water to the air to help you breathe better.  Taking a medicine that thins mucus and clears congestion (expectorant).  Taking a medicine that prevents or stops coughing (cough suppressant).  It is not common to take an antibiotic medicine for this condition.  Follow these instructions at home:    Take over-the-counter and prescription medicines only as told by your health care provider.  Use an inhaler, vaporizer, or humidifier as told by your health care provider.  Take two teaspoons (10 mL) of honey at bedtime to lessen coughing at night.  Drink enough fluid to keep your urine pale yellow.  Do not use any products that contain nicotine or tobacco. These products include cigarettes, chewing tobacco, and vaping devices, such as e-cigarettes. If you need help quitting, ask your health care provider.  Get plenty of rest.  Return to your normal activities as told by your health care provider. Ask your health care provider what activities are safe for you.  Keep all follow-up visits. This is important.  How is this prevented?  To lower your risk of getting this condition again:  Wash your hands often with soap and water for at least 20 seconds. If soap and water are not available, use hand .  Avoid contact with people who have cold symptoms.  Try not to touch your mouth, nose, or eyes with your hands.  Avoid breathing in smoke or chemical fumes. Breathing smoke or chemical fumes will make your condition worse.  Get the flu shot every year.  Contact a health care provider if:  Your symptoms do not improve after 2 weeks.  You have trouble coughing up the mucus.  Your cough keeps you awake at night.  You have  a fever.  Get help right away if you:  Cough up blood.  Feel pain in your chest.  Have severe shortness of breath.  Faint or keep feeling like you are going to faint.  Have a severe headache.  Have a fever or chills that get worse.  These symptoms may represent a serious problem that is an emergency. Do not wait to see if the symptoms will go away. Get medical help right away. Call your local emergency services (911 in the [place] Do not drive yourself to the hospital.  Summary  Acute bronchitis is inflammation of the main airways (bronchi) that come off the windpipe (trachea) in the lungs. The swelling causes the airways to get smaller and make more mucus than normal.  Drinking more fluids can help thin your mucus so it is easier to cough up.  Take over-the-counter and prescription medicines only as told by your health care provider.  Do not use any products that contain nicotine or tobacco. These products include cigarettes, chewing tobacco, and vaping devices, such as e-cigarettes. If you need help quitting, ask your health care provider.  Contact a health care provider if your symptoms do not improve after 2 weeks.  This information is not intended to replace advice given to you by your health care provider. Make sure you discuss any questions you have with your health care provider.  Document Revised: 03/30/2023 Document Reviewed: 04/20/2022  Elsevier Patient Education © 2023 [employer]

## 2024-01-24 ENCOUNTER — TELEPHONE (OUTPATIENT)
Dept: CARDIOLOGY | Facility: CLINIC | Age: 54
End: 2024-01-24

## 2024-01-24 NOTE — TELEPHONE ENCOUNTER
Caller: Chalo Hatch    Relationship to patient: Self    Best call back number: [phone]     Chief complaint: BP    Type of visit: FU    Requested date: AS NEEDED     If rescheduling, when is the original appointment: 01.24.24     Additional notes: PT CALLING TO [facility] APPT AS HE IS SICK - NO AVAILABILITY UNTIL MARCH AND PT STATES IT IS A BP CHECK -

## 2024-01-30 ENCOUNTER — OFFICE VISIT (OUTPATIENT)
Dept: CARDIOLOGY | Facility: CLINIC | Age: 54
End: 2024-01-30
Payer: COMMERCIAL

## 2024-01-30 VITALS
HEIGHT: 75 IN | RESPIRATION RATE: 18 BRPM | SYSTOLIC BLOOD PRESSURE: 155 MMHG | WEIGHT: 315 LBS | HEART RATE: 63 BPM | DIASTOLIC BLOOD PRESSURE: 90 MMHG | BODY MASS INDEX: 39.17 KG/M2

## 2024-01-30 DIAGNOSIS — I49.3 PVCS (PREMATURE VENTRICULAR CONTRACTIONS): ICD-10-CM

## 2024-01-30 DIAGNOSIS — Z00.00 PREVENTATIVE HEALTH CARE: Primary | ICD-10-CM

## 2024-01-30 PROCEDURE — 99214 OFFICE O/P EST MOD 30 MIN: CPT | Performed by: INTERNAL MEDICINE

## 2024-01-30 RX ORDER — ROSUVASTATIN CALCIUM 5 MG/1
1 TABLET, COATED ORAL DAILY
COMMUNITY
Start: 2023-12-12

## 2024-01-31 NOTE — PROGRESS NOTES
Cardiology Clinic Note  Jean Paul Mazariegos MD, PhD    Subjective:     Encounter Date:01/30/2024      Patient ID: Chaol Hatch is a 54 y.o. male.    Chief Complaint:  Chief Complaint   Patient presents with    Follow-up       HPI:        I the pleasure to see this 54-year-old gentleman in follow-up today.  He is previously followed with palpitations, labile hypertension, obesity, history of PVCs, dyslipidemia, he has no anginal chest pain, he has had reassuring work-ups in the past with preserved EF 50 to 55%, normal stress testing 2021 with no ischemia with preserved EF, he has variable attempts at diet and weight loss we discussed low-carb diet, strategies for weight loss, combination with daily exercise, caloric restriction, antihypertensive therapies, goals for heart rate blood pressure cholesterol today for primary and secondary prevention.  He has some low heart rates on bisoprolol in the 50s getting some exertional fatigue, we decreased his bisoprolol to 5 daily to allow higher heart rates with preserved EF, no history of MI, we would prefer rather than increase afterload reduction with hydralazine or other agents to continue with diet weight loss efforts will decrease blood pressures allow possible cessation or de-escalation of antihypertensive therapy which she would be happy about.  He has had prior extreme swelling with amlodipine, he is status post bilateral knee surgeries with only trace ankle edema.  No other presyncope, heart failure signs or symptoms or unstable angina.  His heart rate has not improved much 47 today on encounter despite lower dose.  We discussed cessation of all beta-blockers AV roosevelt blockers today, otherwise he is high functioning doing well NYHA class I CCS class I.  We discussed diet and exercise weight loss and caloric restriction.  He remains overweight    Previous data above copied forward from previous encounters unchanged    Today on repeat encounter is otherwise doing well.   Weight has not changed much unfortunately.  Heart rate blood pressure stable at home on home logs less than 135 systolic heart is in the 60s after discontinuation of his beta-blocker previously.  He denies any unstable angina.  We discussed risk assessment calcium scoring, last resting echo 2020.  No other heart failure or unstable angina or other complaints.    Review of systems otherwise negative x14 point review of systems except as mentioned above     Historical data copied forward from previous encounters in EMR including the history, exam, and assessment/plan has been reviewed and is unchanged unless noted otherwise.     Cardiac medicines reviewed with risk, benefits, and necessity of each discussed.     Risk and benefit of cardiac testing reviewed including death heart attack stroke pain bleeding infection need for vascular /cardiovascular surgery were discussed and the patient      Objective:           Physical Exam  Regular rate and rhythm , no rubs gallops heave or lift, distant heart sounds  Obese soft nontender nondistended  BMI 39  Clear to auscultation  Trace edema ankles  Bilateral scars in his knees well-healed  Intact grossly  No carotid bruits or JVD  Normal radial pulses  Skin is warm and dry normal cap refill  Unchanged from prior encounter     Assessment:         Assessment   Essential hypertension, controlled Home logs 130 over 80s  Medication induced bradycardia, resolved discontinued beta-blocker  Obesity BMI 39, unchanged  Shortness of breath dyspnea on exertion, stable  Bradycardia, resolved     Stop bisoprolol completely previously with continued heart rates in the 40s  Continue home blood pressure log twice daily  Benazepril hydralazine triamterene HCTZ will continue  Continue efforts at weight loss diet and exercise     Diet and exercise weight loss goal BMI really less than 30, short-term get less than 35, try to get weight 10% reduction over the next 6 months  Strategies discussed    "  Home blood pressure log to twice daily, cough clinic for any dizziness lightheadedness syncope or other complaints.  Advise he continue off beta-blockers   He has bradycardia and any symptomatic issues with exertional fatigue syncope presyncope he may need pacemaker in the future    Secondary prevention goals  Discussed heart healthy diet and activity  Repeat yearly labs and lipid studies  2D echo repeat for evaluation of aortic valve per guidelines every 3 to 5 years, last in 2020, murmur on exam today    Former tobacco abuse, abstinent, continue    Jean Paul Mazariegos MD, PhD    Objective:         /90 (BP Location: Left arm, Patient Position: Sitting)   Pulse 63   Resp 18   Ht 190.5 cm (75\")   Wt (!) 146 kg (322 lb)   BMI 40.25 kg/m²     Diagnoses and all orders for this visit:    1. Preventative health care (Primary)  -     CT Cardiac Calcium Score Without Dye; Future  -     Comprehensive Metabolic Panel; Future  -     Lipid Panel; Future  -     CBC (No Diff); Future  -     Magnesium; Future  -     TSH; Future  -     T3; Future  -     T4; Future  -     Hemoglobin A1c; Future  -     PSA Diagnostic; Future    2. PVCs (premature ventricular contractions)  -     Adult Transthoracic Echo Complete W/ Color, Spectral and Contrast if Necessary Per Protocol; Future            The pleasure to be involved in this patient's cardiovascular care.  Please call with any questions or concerns  Jean Paul Mazariegos MD, PhD    Most recent EKG as reviewed and interpreted by me:  Procedures     Most recent echo as reviewed and interpreted by me:  Results for orders placed during the hospital encounter of 02/26/20    Adult Transthoracic Echo Complete W/ Cont if Necessary Per Protocol    Interpretation Summary  · Estimated EF = 50%.  · Left ventricular systolic function is normal.  · Endocardial borders were not seen clearly probably low normal LV systolic function    Technically difficult study endocardial borders were not seen " clearly  Overall most probably preserved LV systolic function EF is around 50%  Calcified aortic valve especially the non-cusp without any significant aortic stenosis  Interatrial septum is intact left atrium is enlarged right atrium is at the prominence of normal  No intracardiac thrombus noted  Aortic root diameter is normal  No significant pericardial effusion noted  Pulmonary leaflets were not seen clearly  Mild mitral calcification without any significant mitral insufficiency      Most recent stress test as reviewed and interpreted by me:  Results for orders placed during the hospital encounter of 02/26/20    Treadmill Stress Test    Interpretation Summary  · The patient reported dyspnea during the stress test.      Most recent cardiac catheterization as reviewed interpreted by me:  No results found for this or any previous visit.    The following portions of the patient's history were reviewed and updated as appropriate: allergies, current medications, past family history, past medical history, past social history, past surgical history, and problem list.      ROS:  14 point review of systems negative except as mentioned above    Current Outpatient Medications:     albuterol sulfate  (90 Base) MCG/ACT inhaler, Inhale 2 puffs Every 4 (Four) Hours As Needed for Wheezing., Disp: 18 g, Rfl: 0    B Complex Vitamins (B COMPLEX 100 PO), Take  by mouth., Disp: , Rfl:     benazepril (LOTENSIN) 40 MG tablet, TAKE 1 TABLET BY MOUTH EVERY DAY, Disp: 90 tablet, Rfl: 1    cetirizine (zyrTEC) 10 MG tablet, Take 1 tablet by mouth every night at bedtime., Disp: , Rfl:     Cholecalciferol 25 MCG (1000 UT) tablet, Take 1 tablet by mouth Daily., Disp: , Rfl:     diclofenac-miSOPROStol (ARTHROTEC 75) 75-0.2 MG EC tablet, Take 1 tablet by mouth Every 12 (Twelve) Hours., Disp: , Rfl:     doxycycline (MONODOX) 100 MG capsule, Take 1 capsule by mouth 2 (Two) Times a Day. (Patient taking differently: Take 1 capsule by mouth  Daily.), Disp: 14 capsule, Rfl: 0    hydrALAZINE (APRESOLINE) 100 MG tablet, Take 1 tablet by mouth 2 (Two) Times a Day., Disp: 60 tablet, Rfl: 3    levothyroxine sodium (TIROSINT) 125 MCG capsule capsule, , Disp: , Rfl:     Omega-3 Fatty Acids (FISH OIL) 1000 MG capsule capsule, Take 1 capsule by mouth Daily., Disp: , Rfl:     rosuvastatin (CRESTOR) 5 MG tablet, Take 1 tablet by mouth Daily., Disp: , Rfl:     triamterene-hydrochlorothiazide (DYAZIDE) 37.5-25 MG per capsule, 2 (Two) Times a Day., Disp: , Rfl:     Problem List:  Patient Active Problem List   Diagnosis    Dyslipidemia    Essential (primary) hypertension    Obesity    PVCs (premature ventricular contractions)    Sleep apnea     Past Medical History:  Past Medical History:   Diagnosis Date    Chronic renal insufficiency     Dyslipidemia     Hyperlipidemia     Hypertension     Obesity     PVC (premature ventricular contraction)     Hx of    PVC (premature ventricular contraction)     Seasonal allergies     Sleep apnea     Using CPAP     Past Surgical History:  Past Surgical History:   Procedure Laterality Date    REPLACEMENT TOTAL KNEE Left 2023    VASECTOMY       Social History:  Social History     Socioeconomic History    Marital status:    Tobacco Use    Smoking status: Former     Packs/day: 1     Types: Cigarettes     Quit date:      Years since quittin.0     Passive exposure: Past    Smokeless tobacco: Never   Vaping Use    Vaping Use: Never used   Substance and Sexual Activity    Alcohol use: Yes     Alcohol/week: 3.0 - 4.0 standard drinks of alcohol     Types: 3 - 4 Cans of beer per week     Comment: Social    Drug use: Never    Sexual activity: Defer     Allergies:  No Known Allergies  Immunizations:    There is no immunization history on file for this patient.         In-Office Procedure(s):  No orders to display        ASCVD RIsk Score::  The ASCVD Risk score (Mary DK, et al., 2019) failed to calculate for the following  reasons:    Cannot find a previous HDL lab    Cannot find a previous total cholesterol lab    Imaging:                 Lab Review:   Admission on 11/22/2023, Discharged on 11/22/2023   Component Date Value    COVID19 11/22/2023 Not Detected     POC Influenza A, Molecul* 11/22/2023 Not Detected     POC Influenza B, Molecul* 11/22/2023 Not Detected     Internal Control 11/22/2023 Passed     Lot Number 11/22/2023 [id number]     Expiration Date 11/22/2023 01/31/2025      Recent labs reviewed and interpreted for clinical significance and application            Level of Care:           Jean Paul Mazariegos MD  01/31/24  .

## 2024-04-04 ENCOUNTER — HOSPITAL ENCOUNTER (OUTPATIENT)
Dept: CARDIOLOGY | Facility: HOSPITAL | Age: 54
Discharge: HOME | End: 2024-04-04
Payer: COMMERCIAL

## 2024-04-04 VITALS
DIASTOLIC BLOOD PRESSURE: 80 MMHG | WEIGHT: 315 LBS | SYSTOLIC BLOOD PRESSURE: 140 MMHG | BODY MASS INDEX: 39.17 KG/M2 | HEIGHT: 75 IN

## 2024-04-04 DIAGNOSIS — I49.3 PVCS (PREMATURE VENTRICULAR CONTRACTIONS): ICD-10-CM

## 2024-04-04 LAB
AORTIC DIMENSIONLESS INDEX: 0.72 (DI)
BH CV ECHO LEFT VENTRICLE GLOBAL LONGITUDINAL STRAIN: -17.6 %
BH CV ECHO MEAS - ACS: 2.3 CM
BH CV ECHO MEAS - AO MAX PG: 7.1 MMHG
BH CV ECHO MEAS - AO MEAN PG: 4 MMHG
BH CV ECHO MEAS - AO V2 MAX: 133 CM/SEC
BH CV ECHO MEAS - AO V2 VTI: 28.5 CM
BH CV ECHO MEAS - AVA(I,D): 3.7 CM2
BH CV ECHO MEAS - EDV(CUBED): 148.9 ML
BH CV ECHO MEAS - EDV(MOD-SP4): 248 ML
BH CV ECHO MEAS - EF(MOD-SP4): 51.6 %
BH CV ECHO MEAS - ESV(CUBED): 35.9 ML
BH CV ECHO MEAS - ESV(MOD-SP4): 120 ML
BH CV ECHO MEAS - FS: 37.7 %
BH CV ECHO MEAS - IVS/LVPW: 1.09 CM
BH CV ECHO MEAS - IVSD: 1.2 CM
BH CV ECHO MEAS - LA DIMENSION: 5 CM
BH CV ECHO MEAS - LAT PEAK E' VEL: 13.6 CM/SEC
BH CV ECHO MEAS - LV DIASTOLIC VOL/BSA (35-75): 92.3 CM2
BH CV ECHO MEAS - LV MASS(C)D: 242 GRAMS
BH CV ECHO MEAS - LV MAX PG: 3.7 MMHG
BH CV ECHO MEAS - LV MEAN PG: 2 MMHG
BH CV ECHO MEAS - LV SYSTOLIC VOL/BSA (12-30): 44.7 CM2
BH CV ECHO MEAS - LV V1 MAX: 96.4 CM/SEC
BH CV ECHO MEAS - LV V1 VTI: 20.1 CM
BH CV ECHO MEAS - LVIDD: 5.3 CM
BH CV ECHO MEAS - LVIDS: 3.3 CM
BH CV ECHO MEAS - LVOT AREA: 5.3 CM2
BH CV ECHO MEAS - LVOT DIAM: 2.6 CM
BH CV ECHO MEAS - LVPWD: 1.1 CM
BH CV ECHO MEAS - MED PEAK E' VEL: 8.3 CM/SEC
BH CV ECHO MEAS - MV A MAX VEL: 64.5 CM/SEC
BH CV ECHO MEAS - MV DEC SLOPE: 217 CM/SEC2
BH CV ECHO MEAS - MV DEC TIME: 0.22 SEC
BH CV ECHO MEAS - MV E MAX VEL: 66.6 CM/SEC
BH CV ECHO MEAS - MV E/A: 1.03
BH CV ECHO MEAS - MV MAX PG: 2.9 MMHG
BH CV ECHO MEAS - MV MEAN PG: 2 MMHG
BH CV ECHO MEAS - MV P1/2T: 115.5 MSEC
BH CV ECHO MEAS - MV V2 VTI: 28.3 CM
BH CV ECHO MEAS - MVA(P1/2T): 1.9 CM2
BH CV ECHO MEAS - MVA(VTI): 3.8 CM2
BH CV ECHO MEAS - PA ACC TIME: 0.15 SEC
BH CV ECHO MEAS - PA V2 MAX: 145 CM/SEC
BH CV ECHO MEAS - RAP SYSTOLE: 8 MMHG
BH CV ECHO MEAS - RV MAX PG: 2.4 MMHG
BH CV ECHO MEAS - RV V1 MAX: 77.4 CM/SEC
BH CV ECHO MEAS - RV V1 VTI: 17.4 CM
BH CV ECHO MEAS - RVDD: 4 CM
BH CV ECHO MEAS - SI(MOD-SP4): 47.6 ML/M2
BH CV ECHO MEAS - SV(LVOT): 106.7 ML
BH CV ECHO MEAS - SV(MOD-SP4): 128 ML
BH CV ECHO MEAS - TAPSE (>1.6): 3 CM
BH CV ECHO MEASUREMENTS AVERAGE E/E' RATIO: 6.08
BH CV XLRA - TDI S': 16.3 CM/SEC
LEFT ATRIUM VOLUME INDEX: 31 ML/M2
SINUS: 3.4 CM
STJ: 3.1 CM

## 2024-04-04 PROCEDURE — 93306 TTE W/DOPPLER COMPLETE: CPT

## 2024-04-04 PROCEDURE — 93356 MYOCRD STRAIN IMG SPCKL TRCK: CPT

## 2024-04-04 PROCEDURE — 25010000002 SULFUR HEXAFLUORIDE MICROSPH 60.7-25 MG RECONSTITUTED SUSPENSION: Performed by: STUDENT IN AN ORGANIZED HEALTH CARE EDUCATION/TRAINING PROGRAM

## 2024-04-04 RX ADMIN — SULFUR HEXAFLUORIDE 2 ML: KIT at 09:43

## 2024-05-09 RX ORDER — HYDRALAZINE HYDROCHLORIDE 100 MG/1
100 TABLET, FILM COATED ORAL 2 TIMES DAILY
Qty: 180 TABLET | Refills: 1 | Status: SHIPPED | OUTPATIENT
Start: 2024-05-09

## 2024-07-31 ENCOUNTER — OFFICE VISIT (OUTPATIENT)
Dept: CARDIOLOGY | Facility: CLINIC | Age: 54
End: 2024-07-31
Payer: COMMERCIAL

## 2024-07-31 VITALS
HEIGHT: 75 IN | DIASTOLIC BLOOD PRESSURE: 85 MMHG | SYSTOLIC BLOOD PRESSURE: 120 MMHG | WEIGHT: 311 LBS | HEART RATE: 63 BPM | RESPIRATION RATE: 18 BRPM | BODY MASS INDEX: 38.67 KG/M2

## 2024-07-31 DIAGNOSIS — E78.5 DYSLIPIDEMIA: ICD-10-CM

## 2024-07-31 DIAGNOSIS — Z00.00 PREVENTATIVE HEALTH CARE: Primary | ICD-10-CM

## 2024-07-31 PROCEDURE — 99214 OFFICE O/P EST MOD 30 MIN: CPT | Performed by: INTERNAL MEDICINE

## 2024-07-31 RX ORDER — TADALAFIL 20 MG/1
1 TABLET ORAL DAILY PRN
COMMUNITY
Start: 2024-07-08

## 2024-08-12 RX ORDER — HYDRALAZINE HYDROCHLORIDE 100 MG/1
100 TABLET, FILM COATED ORAL 2 TIMES DAILY
Qty: 180 TABLET | Refills: 1 | Status: SHIPPED | OUTPATIENT
Start: 2024-08-12

## 2024-08-12 NOTE — PROGRESS NOTES
Cardiology Clinic Note  Jean Paul Mazariegos MD, PhD    Subjective:     Encounter Date:07/31/2024      Patient ID: Chalo Hatch is a 54 y.o. male.    Chief Complaint:  Chief Complaint   Patient presents with    Follow-up       HPI:        I the pleasure to see this 54-year-old gentleman in follow-up today.  He is previously followed with palpitations, labile hypertension, obesity, history of PVCs, dyslipidemia, he has no anginal chest pain, he has had reassuring work-ups in the past with preserved EF 50 to 55% most recently 2024, normal stress testing 2021 with no ischemia with preserved EF, he has variable attempts at diet and weight loss although still above 300 pounds presently, previously we discussed low-carb diet, strategies for weight loss, combination with daily exercise, caloric restriction, antihypertensive therapies, goals for heart rate blood pressure cholesterol today for primary and secondary prevention.  He has some low heart rates on bisoprolol in the 50s getting some exertional fatigue, we decreased his bisoprolol to 5 daily to allow higher heart rates with preserved EF presently now in the 60s off beta-blockers entirely as simply decreasing the medicine did not adequately increase his heart rate, no history of MI, we would prefer rather than increase afterload reduction with hydralazine or other agents to continue with diet weight loss efforts will decrease blood pressures allow possible cessation or de-escalation of antihypertensive therapy which he would be happy about.  He is down 10 pounds year-over-year blood pressures are 120 systolic.  He has had prior extreme swelling with amlodipine, he is status post bilateral knee surgeries with only trace ankle edema.  No other presyncope, heart failure signs or symptoms or unstable angina.  NYHA class I CCS class I.  We discussed diet and exercise weight loss and caloric restriction.  He remains overweight     Previous data above copied forward from  previous encounters unchanged     Calcium scoring is pending  Yearly labs are pending for primary prevention and surveillance      2D echo April 2024  EF 55 to 60% with mild concentric LVH, normal diastolic function by mitral valve inflow, normal atrial sizes, aortic valve sclerosis without elevated gradients.  Trace to mild mitral valve regurgitation, no significant TR or NC.  No valvular stenoses seen, normal strain imaging at -17.6%     Review of systems otherwise negative x14 point review of systems except as mentioned above     Historical data copied forward from previous encounters in EMR including the history, exam, and assessment/plan has been reviewed and is unchanged unless noted otherwise.     Cardiac medicines reviewed with risk, benefits, and necessity of each discussed.     Risk and benefit of cardiac testing reviewed including death heart attack stroke pain bleeding infection need for vascular /cardiovascular surgery were discussed and the patient      Objective:            Physical Exam  Regular rate and rhythm , no rubs gallops heave or lift, distant heart sounds  Obese soft nontender nondistended  BMI 38  Clear to auscultation  Trace edema ankles  Bilateral scars in his knees well-healed  Intact grossly  No carotid bruits or JVD  Normal radial pulses  Skin is warm and dry normal cap refill  Unchanged from prior encounter     Assessment:         Assessment   Essential hypertension, controlled Home logs 130 over 80s  Medication induced bradycardia, resolved discontinued beta-blocker  Obesity BMI 38, 10 pound weight loss year-over-year  Shortness of breath dyspnea on exertion, stable     Continue home blood pressure log twice daily  Benazepril hydralazine triamterene HCTZ will continue  Continue efforts at weight loss diet and exercise     Diet and exercise weight loss goal BMI really less than 30, short-term get less than 35, try to get weight 10% reduction over the next 6 months  Strategies  "discussed     Home blood pressure log to twice daily, cough clinic for any dizziness lightheadedness syncope or other complaints.     Coronary calcium scoring  Yearly labs    Secondary prevention goals  Discussed heart healthy diet and activity  Repeat yearly labs and lipid studies       Former tobacco abuse, abstinent, continue    1 year follow-up    Objective:         /85 (BP Location: Left arm, Patient Position: Sitting)   Pulse 63   Resp 18   Ht 190.5 cm (75\")   Wt (!) 141 kg (311 lb)   BMI 38.87 kg/m²     Physical Exam    Assessment:         Diagnoses and all orders for this visit:    1. Preventative health care (Primary)  -     Lipid Panel; Future  -     Comprehensive Metabolic Panel; Future  -     CT Cardiac Calcium Score Without Dye; Future    2. Dyslipidemia  -     Lipid Panel; Future           Plan:              The pleasure to be involved in this patient's cardiovascular care.  Please call with any questions or concerns  Jean Paul Mazariegos MD, PhD    Most recent EKG as reviewed and interpreted by me:  Procedures     Most recent echo as reviewed and interpreted by me:  Results for orders placed during the hospital encounter of 04/04/24    Adult Transthoracic Echo Complete W/ Color, Spectral and Contrast if Necessary Per Protocol    Interpretation Summary    Left ventricular systolic function is normal. Left ventricular ejection fraction appears to be 56 - 60%.    Left ventricular wall thickness is consistent with mild concentric hypertrophy.    Left ventricular diastolic function was normal.      Most recent stress test as reviewed and interpreted by me:  Results for orders placed during the hospital encounter of 02/26/20    Treadmill Stress Test    Interpretation Summary  · The patient reported dyspnea during the stress test.      Most recent cardiac catheterization as reviewed interpreted by me:  No results found for this or any previous visit.    The following portions of the patient's history " were reviewed and updated as appropriate: allergies, current medications, past family history, past medical history, past social history, past surgical history, and problem list.      ROS:  14 point review of systems negative except as mentioned above    Current Outpatient Medications:     benazepril (LOTENSIN) 40 MG tablet, TAKE 1 TABLET BY MOUTH EVERY DAY, Disp: 90 tablet, Rfl: 1    cetirizine (zyrTEC) 10 MG tablet, Take 1 tablet by mouth every night at bedtime., Disp: , Rfl:     Cholecalciferol 25 MCG (1000 UT) tablet, Take 1 tablet by mouth Daily., Disp: , Rfl:     Glucos-Chond-Sterol-Fish Oil (GLUCOSAMINE CHONDROITIN PLUS PO), Take  by mouth., Disp: , Rfl:     levothyroxine sodium (TIROSINT) 125 MCG capsule capsule, , Disp: , Rfl:     Omega-3 Fatty Acids (FISH OIL) 1000 MG capsule capsule, Take 1 capsule by mouth Daily., Disp: , Rfl:     rosuvastatin (CRESTOR) 5 MG tablet, Take 1 tablet by mouth Daily., Disp: , Rfl:     tadalafil (CIALIS) 20 MG tablet, Take 1 tablet by mouth Daily As Needed., Disp: , Rfl:     triamterene-hydrochlorothiazide (DYAZIDE) 37.5-25 MG per capsule, 2 (Two) Times a Day., Disp: , Rfl:     hydrALAZINE (APRESOLINE) 100 MG tablet, TAKE 1 TABLET BY MOUTH TWICE A DAY, Disp: 180 tablet, Rfl: 1    Problem List:  Patient Active Problem List   Diagnosis    Dyslipidemia    Essential (primary) hypertension    Obesity    PVCs (premature ventricular contractions)    Sleep apnea     Past Medical History:  Past Medical History:   Diagnosis Date    Chronic renal insufficiency     Dyslipidemia     Hyperlipidemia     Hypertension     Obesity     PVC (premature ventricular contraction)     Hx of    PVC (premature ventricular contraction)     Seasonal allergies     Sleep apnea     Using CPAP     Past Surgical History:  Past Surgical History:   Procedure Laterality Date    REPLACEMENT TOTAL KNEE Left 01/18/2023    VASECTOMY       Social History:  Social History     Socioeconomic History    Marital status:     Tobacco Use    Smoking status: Former     Current packs/day: 0.00     Types: Cigarettes     Quit date:      Years since quittin.6     Passive exposure: Past    Smokeless tobacco: Never   Vaping Use    Vaping status: Never Used   Substance and Sexual Activity    Alcohol use: Yes     Alcohol/week: 3.0 - 4.0 standard drinks of alcohol     Types: 3 - 4 Cans of beer per week     Comment: Social    Drug use: Never    Sexual activity: Defer     Allergies:  No Known Allergies  Immunizations:    There is no immunization history on file for this patient.         In-Office Procedure(s):  No orders to display        ASCVD RIsk Score::  The ASCVD Risk score (Mary THOMPSON, et al., 2019) failed to calculate for the following reasons:    Cannot find a previous HDL lab    Cannot find a previous total cholesterol lab    Imaging:                 Lab Review:   Hospital Outpatient Visit on 2024   Component Date Value    LV GLOBAL STRAIN  2024 -17.6     LVIDd 2024 5.3     LVIDs 2024 3.3     IVSd 2024 1.20     LVPWd 2024 1.10     FS 2024 37.7     IVS/LVPW 2024 1.09     ESV(cubed) 2024 35.9     LV Sys Vol (BSA correcte* 2024 44.7     EDV(cubed) 2024 148.9     LV [place] Vol (BSA correct* 2024 92.3     LV mass(C)d 2024 242.0     LVOT area 2024 5.3     LVOT diam 2024 2.6     EDV(MOD-sp4) 2024 248.0     ESV(MOD-sp4) 2024 120.0     SV(MOD-sp4) 2024 128.0     SVi(MOD-SP4) 2024 47.6     EF(MOD-sp4) 2024 51.6     MV E max randy 2024 66.6     MV A max randy 2024 64.5     MV dec time 2024 0.22     MV E/A 2024 1.03     LA ESV Index (BP) 2024 31.0     Med Peak E' Randy 2024 8.3     Lat Peak E' Randy 2024 13.6     Avg E/e' ratio 2024 6.08     SV(LVOT) 2024 106.7     RVIDd 2024 4.0     TAPSE (>1.6) 2024 3.0     RV S' 2024 16.3     LA dimension (2D)   04/04/2024 5.0     LV V1 max 04/04/2024 96.4     LV V1 max PG 04/04/2024 3.7     LV V1 mean PG 04/04/2024 2.00     LV V1 VTI 04/04/2024 20.1     Ao pk randy 04/04/2024 133.0     Ao max PG 04/04/2024 7.1     Ao mean PG 04/04/2024 4.0     Ao V2 VTI 04/04/2024 28.5     MAAME(I,D) 04/04/2024 3.7     MV max PG 04/04/2024 2.9     MV mean PG 04/04/2024 2.00     MV V2 VTI 04/04/2024 28.3     MV P1/2t 04/04/2024 115.5     MVA(P1/2t) 04/04/2024 1.90     MVA(VTI) 04/04/2024 3.8     MV dec slope 04/04/2024 217.0     RAP systole 04/04/2024 8.0     RV V1 max PG 04/04/2024 2.40     RV V1 max 04/04/2024 77.4     RV V1 VTI 04/04/2024 17.4     PA V2 max 04/04/2024 145.0     PA acc time 04/04/2024 0.15     ACS 04/04/2024 2.30     Sinus 04/04/2024 3.4     STJ 04/04/2024 3.1     Dimensionless Index 04/04/2024 0.72      Recent labs reviewed and interpreted for clinical significance and application            Level of Care:           Jean Paul Mazariegos MD  08/12/24  .

## 2024-08-16 ENCOUNTER — LAB (OUTPATIENT)
Dept: LAB | Facility: HOSPITAL | Age: 54
End: 2024-08-16
Payer: COMMERCIAL

## 2024-08-16 DIAGNOSIS — Z00.00 PREVENTATIVE HEALTH CARE: ICD-10-CM

## 2024-08-16 DIAGNOSIS — E78.5 DYSLIPIDEMIA: ICD-10-CM

## 2024-08-16 LAB
ALBUMIN SERPL-MCNC: 4.7 G/DL (ref 3.5–5.2)
ALBUMIN/GLOB SERPL: 1.7 G/DL
ALP SERPL-CCNC: 78 U/L (ref 39–117)
ALT SERPL W P-5'-P-CCNC: 61 U/L (ref 1–41)
ANION GAP SERPL CALCULATED.3IONS-SCNC: 12.2 MMOL/L (ref 5–15)
AST SERPL-CCNC: 38 U/L (ref 1–40)
BILIRUB SERPL-MCNC: 0.4 MG/DL (ref 0–1.2)
BUN SERPL-MCNC: 21 MG/DL (ref 6–20)
BUN/CREAT SERPL: 16.3 (ref 7–25)
CALCIUM SPEC-SCNC: 9.9 MG/DL (ref 8.6–10.5)
CHLORIDE SERPL-SCNC: 102 MMOL/L (ref 98–107)
CHOLEST SERPL-MCNC: 152 MG/DL (ref 0–200)
CO2 SERPL-SCNC: 24.8 MMOL/L (ref 22–29)
CREAT SERPL-MCNC: 1.29 MG/DL (ref 0.76–1.27)
DEPRECATED RDW RBC AUTO: 41.8 FL (ref 37–54)
EGFRCR SERPLBLD CKD-EPI 2021: 65.9 ML/MIN/1.73
ERYTHROCYTE [DISTWIDTH] IN BLOOD BY AUTOMATED COUNT: 13 % (ref 12.3–15.4)
GLOBULIN UR ELPH-MCNC: 2.8 GM/DL
GLUCOSE SERPL-MCNC: 106 MG/DL (ref 65–99)
HBA1C MFR BLD: 6.4 % (ref 4.8–5.6)
HCT VFR BLD AUTO: 46.9 % (ref 37.5–51)
HDLC SERPL-MCNC: 35 MG/DL (ref 40–60)
HGB BLD-MCNC: 15.7 G/DL (ref 13–17.7)
LDLC SERPL CALC-MCNC: 80 MG/DL (ref 0–100)
LDLC/HDLC SERPL: 2.08 {RATIO}
MAGNESIUM SERPL-MCNC: 2.4 MG/DL (ref 1.6–2.6)
MCH RBC QN AUTO: 29.7 PG (ref 26.6–33)
MCHC RBC AUTO-ENTMCNC: 33.5 G/DL (ref 31.5–35.7)
MCV RBC AUTO: 88.7 FL (ref 79–97)
PLATELET # BLD AUTO: 239 10*3/MM3 (ref 140–450)
PMV BLD AUTO: 8.7 FL (ref 6–12)
POTASSIUM SERPL-SCNC: 3.9 MMOL/L (ref 3.5–5.2)
PROT SERPL-MCNC: 7.5 G/DL (ref 6–8.5)
PSA SERPL-MCNC: 0.79 NG/ML (ref 0–4)
RBC # BLD AUTO: 5.29 10*6/MM3 (ref 4.14–5.8)
SODIUM SERPL-SCNC: 139 MMOL/L (ref 136–145)
T3 SERPL-MCNC: 114 NG/DL (ref 80–200)
T4 SERPL-MCNC: 6.85 MCG/DL (ref 4.5–11.7)
TRIGL SERPL-MCNC: 221 MG/DL (ref 0–150)
TSH SERPL DL<=0.05 MIU/L-ACNC: 3.94 UIU/ML (ref 0.27–4.2)
VLDLC SERPL-MCNC: 37 MG/DL (ref 5–40)
WBC NRBC COR # BLD AUTO: 6.25 10*3/MM3 (ref 3.4–10.8)

## 2024-08-16 PROCEDURE — 80053 COMPREHEN METABOLIC PANEL: CPT

## 2024-08-16 PROCEDURE — 83036 HEMOGLOBIN GLYCOSYLATED A1C: CPT

## 2024-08-16 PROCEDURE — 84480 ASSAY TRIIODOTHYRONINE (T3): CPT

## 2024-08-16 PROCEDURE — 36415 COLL VENOUS BLD VENIPUNCTURE: CPT

## 2024-08-16 PROCEDURE — 85027 COMPLETE CBC AUTOMATED: CPT

## 2024-08-16 PROCEDURE — 84153 ASSAY OF PSA TOTAL: CPT

## 2024-08-16 PROCEDURE — 83735 ASSAY OF MAGNESIUM: CPT

## 2024-08-16 PROCEDURE — 84443 ASSAY THYROID STIM HORMONE: CPT

## 2024-08-16 PROCEDURE — 80061 LIPID PANEL: CPT

## 2024-08-16 PROCEDURE — 84436 ASSAY OF TOTAL THYROXINE: CPT

## 2024-09-20 ENCOUNTER — HOSPITAL ENCOUNTER (OUTPATIENT)
Dept: CT IMAGING | Facility: HOSPITAL | Age: 54
Discharge: HOME | End: 2024-09-20
Admitting: INTERNAL MEDICINE

## 2024-09-20 DIAGNOSIS — Z00.00 PREVENTATIVE HEALTH CARE: ICD-10-CM

## 2024-09-20 PROCEDURE — 75571 CT HRT W/O DYE W/CA TEST: CPT

## 2024-09-30 ENCOUNTER — TELEPHONE (OUTPATIENT)
Dept: CARDIOLOGY | Facility: CLINIC | Age: 54
End: 2024-09-30
Payer: COMMERCIAL

## 2024-09-30 RX ORDER — ROSUVASTATIN CALCIUM 20 MG/1
20 TABLET, COATED ORAL DAILY
Qty: 90 TABLET | Refills: 3 | Status: SHIPPED | OUTPATIENT
Start: 2024-09-30

## 2024-09-30 NOTE — TELEPHONE ENCOUNTER
I need to talk to the patient to discuss his ct calclium score which was 181 which means moderate risk of cardiac events in the next 5 years per Helen Clements she wants to increase his statin to crestor 20 mg daily please transfer patient to the office